# Patient Record
Sex: FEMALE | Race: WHITE | Employment: FULL TIME | URBAN - METROPOLITAN AREA
[De-identification: names, ages, dates, MRNs, and addresses within clinical notes are randomized per-mention and may not be internally consistent; named-entity substitution may affect disease eponyms.]

---

## 2017-03-31 ENCOUNTER — EMPLOYEE WELLNESS (OUTPATIENT)
Dept: OTHER | Age: 47
End: 2017-03-31

## 2017-03-31 LAB
CHOLESTEROL, TOTAL: 221 MG/DL (ref 0–199)
GLUCOSE BLD-MCNC: 90 MG/DL (ref 70–99)
HDLC SERPL-MCNC: 36 MG/DL (ref 40–60)
LDL CHOLESTEROL CALCULATED: 153 MG/DL
TRIGL SERPL-MCNC: 162 MG/DL (ref 0–150)

## 2017-04-04 RX ORDER — SIMVASTATIN 10 MG
10 TABLET ORAL NIGHTLY
Qty: 30 TABLET | Refills: 3 | Status: SHIPPED | OUTPATIENT
Start: 2017-04-04 | End: 2017-08-29 | Stop reason: SDUPTHER

## 2017-08-29 RX ORDER — ATENOLOL 50 MG/1
TABLET ORAL
Qty: 30 TABLET | Refills: 10 | Status: SHIPPED | OUTPATIENT
Start: 2017-08-29 | End: 2018-07-25 | Stop reason: SDUPTHER

## 2017-08-29 RX ORDER — SIMVASTATIN 10 MG
10 TABLET ORAL NIGHTLY
Qty: 30 TABLET | Refills: 10 | Status: SHIPPED | OUTPATIENT
Start: 2017-08-29 | End: 2021-10-21 | Stop reason: SINTOL

## 2017-10-19 DIAGNOSIS — I10 ESSENTIAL HYPERTENSION: Primary | ICD-10-CM

## 2017-10-19 DIAGNOSIS — E78.00 PURE HYPERCHOLESTEROLEMIA: ICD-10-CM

## 2017-10-20 DIAGNOSIS — I10 ESSENTIAL HYPERTENSION: ICD-10-CM

## 2017-10-20 DIAGNOSIS — I10 ESSENTIAL HYPERTENSION: Primary | ICD-10-CM

## 2017-10-20 DIAGNOSIS — E78.00 PURE HYPERCHOLESTEROLEMIA: ICD-10-CM

## 2017-10-20 LAB
A/G RATIO: 1.8 (ref 1.1–2.2)
ALBUMIN SERPL-MCNC: 4.6 G/DL (ref 3.4–5)
ALP BLD-CCNC: 54 U/L (ref 40–129)
ALT SERPL-CCNC: 7 U/L (ref 10–40)
ANION GAP SERPL CALCULATED.3IONS-SCNC: 15 MMOL/L (ref 3–16)
AST SERPL-CCNC: 16 U/L (ref 15–37)
BASOPHILS ABSOLUTE: 0.1 K/UL (ref 0–0.2)
BASOPHILS RELATIVE PERCENT: 1 %
BILIRUB SERPL-MCNC: 0.3 MG/DL (ref 0–1)
BILIRUBIN DIRECT: <0.2 MG/DL (ref 0–0.3)
BILIRUBIN, INDIRECT: ABNORMAL MG/DL (ref 0–1)
BUN BLDV-MCNC: 11 MG/DL (ref 7–20)
CALCIUM SERPL-MCNC: 9.3 MG/DL (ref 8.3–10.6)
CHLORIDE BLD-SCNC: 102 MMOL/L (ref 99–110)
CHOLESTEROL, TOTAL: 164 MG/DL (ref 0–199)
CO2: 24 MMOL/L (ref 21–32)
CREAT SERPL-MCNC: 0.8 MG/DL (ref 0.6–1.1)
EOSINOPHILS ABSOLUTE: 0.2 K/UL (ref 0–0.6)
EOSINOPHILS RELATIVE PERCENT: 4.2 %
GFR AFRICAN AMERICAN: >60
GFR NON-AFRICAN AMERICAN: >60
GLOBULIN: 2.5 G/DL
GLUCOSE BLD-MCNC: 102 MG/DL (ref 70–99)
HCT VFR BLD CALC: 37.6 % (ref 36–48)
HDLC SERPL-MCNC: 33 MG/DL (ref 40–60)
HEMOGLOBIN: 12.9 G/DL (ref 12–16)
LDL CHOLESTEROL CALCULATED: 110 MG/DL
LYMPHOCYTES ABSOLUTE: 1.6 K/UL (ref 1–5.1)
LYMPHOCYTES RELATIVE PERCENT: 29 %
MCH RBC QN AUTO: 32.6 PG (ref 26–34)
MCHC RBC AUTO-ENTMCNC: 34.3 G/DL (ref 31–36)
MCV RBC AUTO: 95.1 FL (ref 80–100)
MONOCYTES ABSOLUTE: 0.3 K/UL (ref 0–1.3)
MONOCYTES RELATIVE PERCENT: 5 %
NEUTROPHILS ABSOLUTE: 3.3 K/UL (ref 1.7–7.7)
NEUTROPHILS RELATIVE PERCENT: 60.8 %
PDW BLD-RTO: 13.6 % (ref 12.4–15.4)
PLATELET # BLD: 186 K/UL (ref 135–450)
PMV BLD AUTO: 8.7 FL (ref 5–10.5)
POTASSIUM SERPL-SCNC: 4.5 MMOL/L (ref 3.5–5.1)
RBC # BLD: 3.95 M/UL (ref 4–5.2)
SODIUM BLD-SCNC: 141 MMOL/L (ref 136–145)
TOTAL CK: 53 U/L (ref 26–192)
TOTAL PROTEIN: 7.1 G/DL (ref 6.4–8.2)
TRIGL SERPL-MCNC: 105 MG/DL (ref 0–150)
VLDLC SERPL CALC-MCNC: 21 MG/DL
WBC # BLD: 5.4 K/UL (ref 4–11)

## 2018-02-06 DIAGNOSIS — I10 ESSENTIAL HYPERTENSION: Primary | ICD-10-CM

## 2018-03-20 VITALS — BODY MASS INDEX: 22.47 KG/M2 | WEIGHT: 135 LBS

## 2018-04-06 DIAGNOSIS — I10 HYPERTENSION, UNSPECIFIED TYPE: Primary | ICD-10-CM

## 2018-04-06 PROCEDURE — 93000 ELECTROCARDIOGRAM COMPLETE: CPT | Performed by: INTERNAL MEDICINE

## 2018-04-16 ENCOUNTER — HOSPITAL ENCOUNTER (OUTPATIENT)
Dept: MAMMOGRAPHY | Age: 48
Discharge: HOME | End: 2018-04-16
Attending: OBSTETRICS & GYNECOLOGY | Admitting: OBSTETRICS & GYNECOLOGY

## 2018-04-16 DIAGNOSIS — Z12.31 VISIT FOR SCREENING MAMMOGRAM: ICD-10-CM

## 2018-07-25 RX ORDER — ATENOLOL 50 MG/1
TABLET ORAL
Qty: 30 TABLET | Refills: 10 | Status: SHIPPED | OUTPATIENT
Start: 2018-07-25 | End: 2019-04-19 | Stop reason: SDUPTHER

## 2018-11-05 ENCOUNTER — APPOINTMENT (OUTPATIENT)
Dept: GENERAL RADIOLOGY | Age: 48
End: 2018-11-05
Payer: COMMERCIAL

## 2018-11-05 ENCOUNTER — HOSPITAL ENCOUNTER (OUTPATIENT)
Age: 48
Setting detail: OBSERVATION
Discharge: HOME | End: 2018-11-06
Attending: EMERGENCY MEDICINE | Admitting: EMERGENCY MEDICINE
Payer: COMMERCIAL

## 2018-11-05 DIAGNOSIS — R07.9 CHEST PAIN, UNSPECIFIED TYPE: Primary | ICD-10-CM

## 2018-11-05 PROBLEM — E87.6 HYPOKALEMIA: Status: ACTIVE | Noted: 2018-11-05

## 2018-11-05 PROBLEM — R07.2 PRECORDIAL PAIN: Status: ACTIVE | Noted: 2018-11-05

## 2018-11-05 LAB
A/G RATIO: 1.5 (ref 1.1–2.2)
ALBUMIN SERPL-MCNC: 4.5 G/DL (ref 3.4–5)
ALP BLD-CCNC: 42 U/L (ref 40–129)
ALT SERPL-CCNC: 7 U/L (ref 10–40)
ANION GAP SERPL CALCULATED.3IONS-SCNC: 18 MMOL/L (ref 3–16)
AST SERPL-CCNC: 13 U/L (ref 15–37)
BASOPHILS ABSOLUTE: 0.1 K/UL (ref 0–0.2)
BASOPHILS RELATIVE PERCENT: 0.5 %
BILIRUB SERPL-MCNC: <0.2 MG/DL (ref 0–1)
BUN BLDV-MCNC: 12 MG/DL (ref 7–20)
CALCIUM SERPL-MCNC: 9.3 MG/DL (ref 8.3–10.6)
CHLORIDE BLD-SCNC: 97 MMOL/L (ref 99–110)
CO2: 21 MMOL/L (ref 21–32)
CREAT SERPL-MCNC: 0.7 MG/DL (ref 0.6–1.1)
D DIMER: 200 NG/ML DDU (ref 0–229)
EOSINOPHILS ABSOLUTE: 0 K/UL (ref 0–0.6)
EOSINOPHILS RELATIVE PERCENT: 0.3 %
GFR AFRICAN AMERICAN: >60
GFR NON-AFRICAN AMERICAN: >60
GLOBULIN: 3 G/DL
GLUCOSE BLD-MCNC: 143 MG/DL (ref 70–99)
HCG QUALITATIVE: NEGATIVE
HCT VFR BLD CALC: 30.7 % (ref 36–48)
HEMOGLOBIN: 9.8 G/DL (ref 12–16)
LYMPHOCYTES ABSOLUTE: 2.1 K/UL (ref 1–5.1)
LYMPHOCYTES RELATIVE PERCENT: 14.1 %
MAGNESIUM: 2 MG/DL (ref 1.8–2.4)
MCH RBC QN AUTO: 25.3 PG (ref 26–34)
MCHC RBC AUTO-ENTMCNC: 31.8 G/DL (ref 31–36)
MCV RBC AUTO: 79.6 FL (ref 80–100)
MONOCYTES ABSOLUTE: 0.6 K/UL (ref 0–1.3)
MONOCYTES RELATIVE PERCENT: 3.8 %
NEUTROPHILS ABSOLUTE: 11.9 K/UL (ref 1.7–7.7)
NEUTROPHILS RELATIVE PERCENT: 81.3 %
PDW BLD-RTO: 15.4 % (ref 12.4–15.4)
PLATELET # BLD: 288 K/UL (ref 135–450)
PMV BLD AUTO: 8.4 FL (ref 5–10.5)
POTASSIUM REFLEX MAGNESIUM: 3.4 MMOL/L (ref 3.5–5.1)
RBC # BLD: 3.85 M/UL (ref 4–5.2)
SODIUM BLD-SCNC: 136 MMOL/L (ref 136–145)
TOTAL PROTEIN: 7.5 G/DL (ref 6.4–8.2)
TROPONIN: <0.01 NG/ML
WBC # BLD: 14.7 K/UL (ref 4–11)

## 2018-11-05 PROCEDURE — 99285 EMERGENCY DEPT VISIT HI MDM: CPT

## 2018-11-05 PROCEDURE — 85025 COMPLETE CBC W/AUTO DIFF WBC: CPT

## 2018-11-05 PROCEDURE — 2580000003 HC RX 258: Performed by: INTERNAL MEDICINE

## 2018-11-05 PROCEDURE — 84703 CHORIONIC GONADOTROPIN ASSAY: CPT

## 2018-11-05 PROCEDURE — 93005 ELECTROCARDIOGRAM TRACING: CPT | Performed by: PHYSICIAN ASSISTANT

## 2018-11-05 PROCEDURE — 80053 COMPREHEN METABOLIC PANEL: CPT

## 2018-11-05 PROCEDURE — 6370000000 HC RX 637 (ALT 250 FOR IP): Performed by: INTERNAL MEDICINE

## 2018-11-05 PROCEDURE — G0378 HOSPITAL OBSERVATION PER HR: HCPCS

## 2018-11-05 PROCEDURE — 84484 ASSAY OF TROPONIN QUANT: CPT

## 2018-11-05 PROCEDURE — 71045 X-RAY EXAM CHEST 1 VIEW: CPT

## 2018-11-05 PROCEDURE — 6370000000 HC RX 637 (ALT 250 FOR IP): Performed by: PHYSICIAN ASSISTANT

## 2018-11-05 PROCEDURE — 83735 ASSAY OF MAGNESIUM: CPT

## 2018-11-05 PROCEDURE — 85379 FIBRIN DEGRADATION QUANT: CPT

## 2018-11-05 RX ORDER — NORGESTIMATE AND ETHINYL ESTRADIOL 0.25-0.035
1 KIT ORAL NIGHTLY
Status: DISCONTINUED | OUTPATIENT
Start: 2018-11-05 | End: 2018-11-05

## 2018-11-05 RX ORDER — PREDNISONE 20 MG/1
20 TABLET ORAL 3 TIMES DAILY
Status: ON HOLD | COMMUNITY
Start: 2018-11-02 | End: 2018-11-06 | Stop reason: HOSPADM

## 2018-11-05 RX ORDER — POTASSIUM CHLORIDE 20 MEQ/1
40 TABLET, EXTENDED RELEASE ORAL ONCE
Status: COMPLETED | OUTPATIENT
Start: 2018-11-05 | End: 2018-11-05

## 2018-11-05 RX ORDER — NITROGLYCERIN 0.4 MG/1
0.4 TABLET SUBLINGUAL EVERY 5 MIN PRN
Status: DISCONTINUED | OUTPATIENT
Start: 2018-11-05 | End: 2018-11-06 | Stop reason: HOSPADM

## 2018-11-05 RX ORDER — ATENOLOL 50 MG/1
50 TABLET ORAL NIGHTLY
Status: DISCONTINUED | OUTPATIENT
Start: 2018-11-05 | End: 2018-11-06 | Stop reason: HOSPADM

## 2018-11-05 RX ORDER — ATORVASTATIN CALCIUM 40 MG/1
40 TABLET, FILM COATED ORAL NIGHTLY
Status: DISCONTINUED | OUTPATIENT
Start: 2018-11-05 | End: 2018-11-06 | Stop reason: HOSPADM

## 2018-11-05 RX ORDER — ONDANSETRON 2 MG/ML
4 INJECTION INTRAMUSCULAR; INTRAVENOUS EVERY 6 HOURS PRN
Status: DISCONTINUED | OUTPATIENT
Start: 2018-11-05 | End: 2018-11-06 | Stop reason: HOSPADM

## 2018-11-05 RX ORDER — NORGESTIMATE AND ETHINYL ESTRADIOL 0.25-0.035
1 KIT ORAL NIGHTLY
COMMUNITY
End: 2018-12-18 | Stop reason: ALTCHOICE

## 2018-11-05 RX ORDER — SODIUM CHLORIDE 0.9 % (FLUSH) 0.9 %
10 SYRINGE (ML) INJECTION PRN
Status: DISCONTINUED | OUTPATIENT
Start: 2018-11-05 | End: 2018-11-06 | Stop reason: HOSPADM

## 2018-11-05 RX ORDER — M-VIT,TX,IRON,MINS/CALC/FOLIC 27MG-0.4MG
1 TABLET ORAL NIGHTLY
COMMUNITY

## 2018-11-05 RX ORDER — ASPIRIN 81 MG/1
81 TABLET, CHEWABLE ORAL DAILY
Status: DISCONTINUED | OUTPATIENT
Start: 2018-11-06 | End: 2018-11-06 | Stop reason: HOSPADM

## 2018-11-05 RX ORDER — SIMVASTATIN 10 MG
10 TABLET ORAL NIGHTLY
Status: DISCONTINUED | OUTPATIENT
Start: 2018-11-05 | End: 2018-11-05 | Stop reason: ALTCHOICE

## 2018-11-05 RX ORDER — SODIUM CHLORIDE 0.9 % (FLUSH) 0.9 %
10 SYRINGE (ML) INJECTION EVERY 12 HOURS SCHEDULED
Status: DISCONTINUED | OUTPATIENT
Start: 2018-11-05 | End: 2018-11-06 | Stop reason: HOSPADM

## 2018-11-05 RX ADMIN — Medication 10 ML: at 23:46

## 2018-11-05 RX ADMIN — NITROGLYCERIN 0.5 INCH: 20 OINTMENT TOPICAL at 20:35

## 2018-11-05 RX ADMIN — NITROGLYCERIN 0.4 MG: 0.4 TABLET, ORALLY DISINTEGRATING SUBLINGUAL at 19:19

## 2018-11-05 RX ADMIN — ATORVASTATIN CALCIUM 40 MG: 40 TABLET, FILM COATED ORAL at 23:46

## 2018-11-05 RX ADMIN — POTASSIUM CHLORIDE 40 MEQ: 20 TABLET, EXTENDED RELEASE ORAL at 22:01

## 2018-11-05 RX ADMIN — NITROGLYCERIN 0.4 MG: 0.4 TABLET, ORALLY DISINTEGRATING SUBLINGUAL at 19:12

## 2018-11-05 RX ADMIN — NITROGLYCERIN 0.5 INCH: 20 OINTMENT TOPICAL at 23:46

## 2018-11-05 RX ADMIN — ATENOLOL 50 MG: 50 TABLET ORAL at 23:46

## 2018-11-05 ASSESSMENT — PAIN SCALES - GENERAL
PAINLEVEL_OUTOF10: 5
PAINLEVEL_OUTOF10: 1
PAINLEVEL_OUTOF10: 9
PAINLEVEL_OUTOF10: 3

## 2018-11-05 ASSESSMENT — PAIN DESCRIPTION - PAIN TYPE
TYPE: ACUTE PAIN

## 2018-11-05 ASSESSMENT — PAIN DESCRIPTION - LOCATION
LOCATION: CHEST

## 2018-11-05 ASSESSMENT — PAIN DESCRIPTION - PROGRESSION
CLINICAL_PROGRESSION: RAPIDLY IMPROVING
CLINICAL_PROGRESSION: GRADUALLY WORSENING

## 2018-11-05 ASSESSMENT — PAIN DESCRIPTION - DESCRIPTORS
DESCRIPTORS: TIGHTNESS
DESCRIPTORS: TIGHTNESS
DESCRIPTORS: TIGHTNESS;SHARP;ACHING

## 2018-11-05 ASSESSMENT — HEART SCORE: ECG: 1

## 2018-11-06 VITALS
SYSTOLIC BLOOD PRESSURE: 159 MMHG | TEMPERATURE: 98.6 F | HEIGHT: 66 IN | DIASTOLIC BLOOD PRESSURE: 95 MMHG | WEIGHT: 132.72 LBS | OXYGEN SATURATION: 97 % | RESPIRATION RATE: 16 BRPM | BODY MASS INDEX: 21.33 KG/M2 | HEART RATE: 66 BPM

## 2018-11-06 PROBLEM — R07.9 CHEST PAIN: Status: RESOLVED | Noted: 2018-11-05 | Resolved: 2018-11-06

## 2018-11-06 PROBLEM — K21.9 GERD (GASTROESOPHAGEAL REFLUX DISEASE): Status: ACTIVE | Noted: 2018-11-06

## 2018-11-06 PROBLEM — D50.9 MICROCYTIC ANEMIA: Status: ACTIVE | Noted: 2018-11-06

## 2018-11-06 LAB
ANION GAP SERPL CALCULATED.3IONS-SCNC: 13 MMOL/L (ref 3–16)
BUN BLDV-MCNC: 15 MG/DL (ref 7–20)
CALCIUM SERPL-MCNC: 8.6 MG/DL (ref 8.3–10.6)
CHLORIDE BLD-SCNC: 101 MMOL/L (ref 99–110)
CHOLESTEROL, TOTAL: 192 MG/DL (ref 0–199)
CO2: 24 MMOL/L (ref 21–32)
CREAT SERPL-MCNC: 0.8 MG/DL (ref 0.6–1.1)
EKG ATRIAL RATE: 59 BPM
EKG ATRIAL RATE: 64 BPM
EKG ATRIAL RATE: 77 BPM
EKG DIAGNOSIS: NORMAL
EKG P AXIS: 15 DEGREES
EKG P AXIS: 43 DEGREES
EKG P AXIS: 73 DEGREES
EKG P-R INTERVAL: 120 MS
EKG P-R INTERVAL: 128 MS
EKG P-R INTERVAL: 134 MS
EKG Q-T INTERVAL: 404 MS
EKG Q-T INTERVAL: 426 MS
EKG Q-T INTERVAL: 438 MS
EKG QRS DURATION: 82 MS
EKG QRS DURATION: 82 MS
EKG QRS DURATION: 90 MS
EKG QTC CALCULATION (BAZETT): 421 MS
EKG QTC CALCULATION (BAZETT): 451 MS
EKG QTC CALCULATION (BAZETT): 457 MS
EKG R AXIS: -3 DEGREES
EKG R AXIS: -4 DEGREES
EKG R AXIS: 0 DEGREES
EKG T AXIS: 22 DEGREES
EKG T AXIS: 29 DEGREES
EKG T AXIS: 34 DEGREES
EKG VENTRICULAR RATE: 59 BPM
EKG VENTRICULAR RATE: 64 BPM
EKG VENTRICULAR RATE: 77 BPM
FERRITIN: 4.8 NG/ML (ref 15–150)
GFR AFRICAN AMERICAN: >60
GFR NON-AFRICAN AMERICAN: >60
GLUCOSE BLD-MCNC: 95 MG/DL (ref 70–99)
HCT VFR BLD CALC: 26.7 % (ref 36–48)
HDLC SERPL-MCNC: 34 MG/DL (ref 40–60)
HEMOGLOBIN: 8.6 G/DL (ref 12–16)
LDL CHOLESTEROL CALCULATED: 116 MG/DL
LV EF: 73 %
LVEF MODALITY: NORMAL
MCH RBC QN AUTO: 25.3 PG (ref 26–34)
MCHC RBC AUTO-ENTMCNC: 32 G/DL (ref 31–36)
MCV RBC AUTO: 79.1 FL (ref 80–100)
PDW BLD-RTO: 15.6 % (ref 12.4–15.4)
PLATELET # BLD: 232 K/UL (ref 135–450)
PMV BLD AUTO: 8.2 FL (ref 5–10.5)
POTASSIUM REFLEX MAGNESIUM: 4 MMOL/L (ref 3.5–5.1)
RBC # BLD: 3.38 M/UL (ref 4–5.2)
SODIUM BLD-SCNC: 138 MMOL/L (ref 136–145)
TRIGL SERPL-MCNC: 210 MG/DL (ref 0–150)
TROPONIN: <0.01 NG/ML
TROPONIN: <0.01 NG/ML
VLDLC SERPL CALC-MCNC: 42 MG/DL
WBC # BLD: 11.1 K/UL (ref 4–11)

## 2018-11-06 PROCEDURE — 82728 ASSAY OF FERRITIN: CPT

## 2018-11-06 PROCEDURE — 6360000002 HC RX W HCPCS: Performed by: INTERNAL MEDICINE

## 2018-11-06 PROCEDURE — 36415 COLL VENOUS BLD VENIPUNCTURE: CPT

## 2018-11-06 PROCEDURE — 93010 ELECTROCARDIOGRAM REPORT: CPT | Performed by: INTERNAL MEDICINE

## 2018-11-06 PROCEDURE — 2580000003 HC RX 258: Performed by: INTERNAL MEDICINE

## 2018-11-06 PROCEDURE — 6370000000 HC RX 637 (ALT 250 FOR IP): Performed by: INTERNAL MEDICINE

## 2018-11-06 PROCEDURE — 78452 HT MUSCLE IMAGE SPECT MULT: CPT

## 2018-11-06 PROCEDURE — 93005 ELECTROCARDIOGRAM TRACING: CPT | Performed by: INTERNAL MEDICINE

## 2018-11-06 PROCEDURE — 2580000003 HC RX 258

## 2018-11-06 PROCEDURE — 80061 LIPID PANEL: CPT

## 2018-11-06 PROCEDURE — 80048 BASIC METABOLIC PNL TOTAL CA: CPT

## 2018-11-06 PROCEDURE — 84484 ASSAY OF TROPONIN QUANT: CPT

## 2018-11-06 PROCEDURE — G0378 HOSPITAL OBSERVATION PER HR: HCPCS

## 2018-11-06 PROCEDURE — 93017 CV STRESS TEST TRACING ONLY: CPT

## 2018-11-06 PROCEDURE — 99220 PR INITIAL OBSERVATION CARE/DAY 70 MINUTES: CPT | Performed by: INTERNAL MEDICINE

## 2018-11-06 PROCEDURE — 99244 OFF/OP CNSLTJ NEW/EST MOD 40: CPT | Performed by: INTERNAL MEDICINE

## 2018-11-06 PROCEDURE — A9502 TC99M TETROFOSMIN: HCPCS | Performed by: INTERNAL MEDICINE

## 2018-11-06 PROCEDURE — 3430000000 HC RX DIAGNOSTIC RADIOPHARMACEUTICAL: Performed by: INTERNAL MEDICINE

## 2018-11-06 PROCEDURE — 85027 COMPLETE CBC AUTOMATED: CPT

## 2018-11-06 RX ORDER — IBUPROFEN 400 MG/1
400 TABLET ORAL EVERY 6 HOURS PRN
Status: DISCONTINUED | OUTPATIENT
Start: 2018-11-06 | End: 2018-11-06 | Stop reason: HOSPADM

## 2018-11-06 RX ORDER — FAMOTIDINE 20 MG/1
20 TABLET, FILM COATED ORAL 2 TIMES DAILY
Qty: 60 TABLET | Refills: 3 | Status: SHIPPED | OUTPATIENT
Start: 2018-11-06

## 2018-11-06 RX ORDER — DOXYCYCLINE HYCLATE 50 MG/1
CAPSULE, GELATIN COATED ORAL
Qty: 60 TABLET | Refills: 3 | Status: SHIPPED | OUTPATIENT
Start: 2018-11-06 | End: 2019-04-02

## 2018-11-06 RX ORDER — DOXYCYCLINE HYCLATE 50 MG/1
324 CAPSULE, GELATIN COATED ORAL
Status: DISCONTINUED | OUTPATIENT
Start: 2018-11-06 | End: 2018-11-06 | Stop reason: HOSPADM

## 2018-11-06 RX ORDER — RANITIDINE 150 MG/1
150 TABLET ORAL 2 TIMES DAILY
Status: DISCONTINUED | OUTPATIENT
Start: 2018-11-06 | End: 2018-11-06 | Stop reason: CLARIF

## 2018-11-06 RX ORDER — SODIUM CHLORIDE 9 MG/ML
INJECTION, SOLUTION INTRAVENOUS
Status: COMPLETED
Start: 2018-11-06 | End: 2018-11-06

## 2018-11-06 RX ORDER — FAMOTIDINE 20 MG/1
20 TABLET, FILM COATED ORAL 2 TIMES DAILY
Status: DISCONTINUED | OUTPATIENT
Start: 2018-11-06 | End: 2018-11-06 | Stop reason: HOSPADM

## 2018-11-06 RX ADMIN — FAMOTIDINE 20 MG: 20 TABLET ORAL at 13:22

## 2018-11-06 RX ADMIN — REGADENOSON 0.4 MG: 0.08 INJECTION, SOLUTION INTRAVENOUS at 09:08

## 2018-11-06 RX ADMIN — TETROFOSMIN 30 MILLICURIE: 0.23 INJECTION, POWDER, LYOPHILIZED, FOR SOLUTION INTRAVENOUS at 09:16

## 2018-11-06 RX ADMIN — Medication 10 ML: at 10:31

## 2018-11-06 RX ADMIN — TETROFOSMIN 10 MILLICURIE: 0.23 INJECTION, POWDER, LYOPHILIZED, FOR SOLUTION INTRAVENOUS at 07:23

## 2018-11-06 RX ADMIN — IRON SUCROSE 200 MG: 20 INJECTION, SOLUTION INTRAVENOUS at 10:36

## 2018-11-06 RX ADMIN — SODIUM CHLORIDE 250 ML: 9 INJECTION, SOLUTION INTRAVENOUS at 10:36

## 2018-11-06 RX ADMIN — ASPIRIN 81 MG 81 MG: 81 TABLET ORAL at 10:31

## 2018-11-06 NOTE — PLAN OF CARE
Problem: Falls - Risk of:  Goal: Will remain free from falls  Will remain free from falls  Outcome: Ongoing  Fall risk assessed. Precautions in place. Bed low and locked with side rails up x2. Nonskid socks on when OOB. Pt UAL with a steady gait. No bed alarm in use. Agrees to call for assistance as needed. Call light within reach. Frequent checks performed. No falls at this time. Problem: Pain:  Goal: Pain level will decrease  Pain level will decrease  Outcome: Ongoing  Pain level assessed. Pt able to rate pain on a scale of 0-10. Pt denies pain at this time. Will continue to monitor for s/s of discomfort.

## 2018-11-06 NOTE — ED NOTES
Pharmacy Medication Reconciliation Note     List of medications patient is currently taking is complete. Source of information:   1. Patient   2. EMR    Notes regarding home medications:   1. Patient received her morning home medication doses before presenting to the ER. Pt takes most of her medications at night. 2. Pt was started on prednisone 20 mg TID on 11/2 for 5 days for a rash caused by [facility].       [id number] [facility] Regino, Pharmacy Intern  11/5/2018 9:02 PM

## 2018-11-06 NOTE — H&P
11/06/2018    RDW 15.6 11/06/2018    LYMPHOPCT 14.1 11/05/2018    MONOPCT 3.8 11/05/2018    BASOPCT 0.5 11/05/2018    MONOSABS 0.6 11/05/2018    LYMPHSABS 2.1 11/05/2018    EOSABS 0.0 11/05/2018    BASOSABS 0.1 11/05/2018     CMP:    Lab Results   Component Value Date     11/06/2018    K 4.0 11/06/2018     11/06/2018    CO2 24 11/06/2018    BUN 15 11/06/2018    CREATININE 0.8 11/06/2018    GFRAA >60 11/06/2018    GFRAA >60 07/03/2012    AGRATIO 1.5 11/05/2018    LABGLOM >60 11/06/2018    GLUCOSE 95 11/06/2018    PROT 7.5 11/05/2018    PROT 7.2 07/03/2012    LABALBU 4.5 11/05/2018    CALCIUM 8.6 11/06/2018    BILITOT <0.2 11/05/2018    ALKPHOS 42 11/05/2018    AST 13 11/05/2018    ALT 7 11/05/2018     Last 3 Troponin:    Lab Results   Component Value Date    TROPONINI <0.01 11/06/2018    TROPONINI <0.01 11/06/2018    TROPONINI <0.01 11/05/2018       cxr clear  D dimer 200   HDL 34    Assessment:     Patient Active Hospital Problem List:   Precordial pain (11/5/2018)    Assessment: seems atypical for angina of new onset     Plan: proceed with stress test   HTN (hypertension) ()    Assessment: has been running high while here partly due to headache and ?missed dose    Plan: observe   Hypokalemia (11/5/2018)    Assessment: present     Plan: corrected with replacement   Microcytic anemia (11/6/2018)    Assessment: new onset and pretty severe most likely due to her heavy menses    Plan: check iron levels ie ferritin and then start replacing   GERD (gastroesophageal reflux disease) (11/6/2018)    Assessment: could be what caused her discomfort    Plan: try severo Hernández MD

## 2018-11-06 NOTE — CONSULTS
[address] [facility]  1970    November 6, 2018    Reason for Consult: Chest Pain    CC: Chest Pain    HPI:  The patient is 53 y.o. female with a past medical history significant for hyperlipidemia and essential hypertension who presented to [facility] with chest pain. Clarita Deja states that she developed back pain yesterday while driving home. The pain began to radiate around to her chest. It felt like her bra was too tight. She became nauseous and shortness of breath at home. There were no aggravating or relieving factors. She rated it a 10 out of 10 in severity. The pain was sharp. In the ED, she was given sublingual nitro X 2 and she feels that relieved it temporarily. The pain returned and she was gven another nitro in the ED with further relief. She has no history of heart disease in the past.     The anemia is new for her. She knows she has fibroids and is due to have a hysterectomy in December. There has been no blood in her stool or urine. She barbour shave heavy menstrual periods. Review of Systems:  Constitutional: No fatigue, weakness, night sweats or fever. HEENT: No new vision difficulties or ringing in the ears. Respiratory: No new SOB, PND, orthopnea or cough. Cardiovascular: See HPI   GI: No n/v, diarrhea, constipation, abdominal pain or changes in bowel habits. No melena, no hematochezia  : No urinary frequency, urgency, incontinence, hematuria or dysuria. Skin: No cyanosis or skin lesions. Musculoskeletal: No new muscle or joint pain. Neurological: No syncope or TIA-like symptoms.   Psychiatric: No anxiety, insomnia or depression     Past Medical History:   Diagnosis Date    High blood pressure     Hyperlipidemia     Seasonal allergies      Past Surgical History:   Procedure Laterality Date    APPENDECTOMY      TONSILLECTOMY AND ADENOIDECTOMY       Family History   Problem Relation Age of Onset    High Cholesterol Mother     Stroke Kristie Mccray MD        enoxaparin (LOVENOX) injection 40 mg  40 mg Subcutaneous Daily Hiren Napoles MD           Physical Exam:   BP (!) 147/83   Pulse 71   Temp 98.7 °F (37.1 °C) (Oral)   Resp 20   Ht 5' 6\" (1.676 m)   Wt 132 lb 11.5 oz (60.2 kg)   SpO2 98%   BMI 21.42 kg/m²     Intake/Output Summary (Last 24 hours) at 11/06/18 1001  Last data filed at 11/05/18 2350   Gross per 24 hour   Intake              300 ml   Output              700 ml   Net             -400 ml     Wt Readings from Last 2 Encounters:   11/06/18 132 lb 11.5 oz (60.2 kg)   03/31/17 135 lb (61.2 kg)     Constitutional: She is oriented to person, place, and time. She appears well-developed and well-nourished. In no acute distress. Head: Normocephalic and atraumatic. Neck: Neck supple. No JVD present. Carotid bruit is not present. No mass and no thyromegaly present. No lymphadenopathy present. Cardiovascular: Normal rate, regular rhythm, normal heart sounds and intact distal pulses. Exam reveals no gallop and no friction rub. No murmur heard. Pulmonary/Chest: Effort normal and breath sounds normal. No respiratory distress. She has no wheezes, rhonchi or rales. Abdominal: Soft, non-tender. Bowel sounds and aorta are normal. She exhibits no organomegaly, mass or bruit. Extremities: No edema, cyanosis, or clubbing. Pulses are 2+ radial/carotid/dorsalis pedis and posterior tibial bilaterally. Neurological: She is alert and oriented to person, place, and time. She has normal reflexes. No cranial nerve deficit. Coordination normal.   Skin: Skin is warm and dry. There is no rash or diaphoresis. Psychiatric: She has a normal mood and affect.  Her speech is normal and behavior is normal.     EKG Interpretation: Sinus rhythm    Lab Review:   Lab Results   Component Value Date    TRIG 210 11/06/2018    HDL 34 11/06/2018    LDLCALC 116 11/06/2018    LABVLDL 42 11/06/2018     Lab Results   Component Value Date     11/06/2018    K 4.0 11/06/2018    BUN 15 11/06/2018    CREATININE 0.8 11/06/2018     Recent Labs      11/05/18   1909  11/06/18   0415   WBC  14.7*  11.1*   HGB  9.8*  8.6*   HCT  30.7*  26.7*   PLT  288  232       Assessment:  1. Chest Pain  2. Hypertensive Urgency  3. Anemia, unspecified      Plan:  Elisha Martinez seems to be doing better today. Her chest pain has some atypical features for angina. It id however, respond to nitrates. This could be esophageal spasm or other GI pathology. Her anemia is concerning but could be due to the uterine fibroids and have menstrual periods she describes. Her ECG and stress perfusion study are within normal limits. I do feel more confident that her chest pain is noncardiac. She will follow up in our office as needed for recurrent chest pain. If her blood pressure is not well controlled, she could be switched to carvedilol from atenolol for better control. This would also enable to vasodilation in the event that vasospasm was involved. I will defer this to Dr. Angel Keys as well as the work-up of the anemia. I question a bit whether she might need an EGD in the future given the anemia and chest pain.

## 2018-11-06 NOTE — ED PROVIDER NOTES
LABS:    Labs Reviewed   CBC WITH AUTO DIFFERENTIAL - Abnormal; Notable for the following:        Result Value    WBC 14.7 (*)     RBC 3.85 (*)     Hemoglobin 9.8 (*)     Hematocrit 30.7 (*)     MCV 79.6 (*)     MCH 25.3 (*)     Neutrophils # 11.9 (*)     All other components within normal limits    Narrative:     Performed at:  [address] [address]   Phone [phone]   COMPREHENSIVE METABOLIC PANEL W/ REFLEX TO MG FOR LOW K - Abnormal; Notable for the following:     Potassium reflex Magnesium 3.4 (*)     Chloride 97 (*)     Anion Gap 18 (*)     Glucose 143 (*)     ALT 7 (*)     AST 13 (*)     All other components within normal limits    Narrative:     Performed at:  [address] [address]   Phone [phone]   TROPONIN    Narrative:     Performed at:  [facility]  [address] [place] [address]   Phone [phone]   HCG, SERUM, QUALITATIVE    Narrative:     Performed at:  [address] [address]   Phone [phone]   D-DIMER, QUANTITATIVE    Narrative:     Performed at:  [facility]  [address] [place] [address]   Phone [phone]   MAGNESIUM    Narrative:     Performed at:  [facility]  [address] [place] [address]   Phone [phone]       All other labs were within normal range or not returned as of this dictation. EKG: All EKG's are interpreted by the Emergency Department Physician who either signs orCo-signs this chart in the absence of a cardiologist.  Please see their note for interpretation of EKG.       RADIOLOGY:   Non-plain film images such as CT, Ultrasound and MRI are read by the radiologist. Plain radiographic images are visualized andpreliminarily

## 2018-11-06 NOTE — PROGRESS NOTES
Pt A&Ox4. Admit to room 4279 from the ED via stretcher. Family at the bedside. Ambulates independently with a steady gait. Denies c/o n/v, n/t, SOB or CP. Oriented to room and unit. No s/s of distress noted. Call light within reach. Will continue to monitor.

## 2018-11-29 DIAGNOSIS — K90.49 MALABSORPTION DUE TO INTOLERANCE, NOT ELSEWHERE CLASSIFIED: ICD-10-CM

## 2018-11-29 DIAGNOSIS — D50.9 IRON DEFICIENCY ANEMIA, UNSPECIFIED IRON DEFICIENCY ANEMIA TYPE: ICD-10-CM

## 2018-11-29 RX ORDER — SODIUM CHLORIDE 9 MG/ML
INJECTION, SOLUTION INTRAVENOUS CONTINUOUS
Status: CANCELLED | OUTPATIENT
Start: 2018-11-29

## 2018-11-29 RX ORDER — METHYLPREDNISOLONE SODIUM SUCCINATE 125 MG/2ML
125 INJECTION, POWDER, LYOPHILIZED, FOR SOLUTION INTRAMUSCULAR; INTRAVENOUS ONCE
Status: CANCELLED | OUTPATIENT
Start: 2018-11-29 | End: 2018-11-29

## 2018-11-29 RX ORDER — EPINEPHRINE 1 MG/ML
0.3 INJECTION, SOLUTION, CONCENTRATE INTRAVENOUS PRN
Status: CANCELLED | OUTPATIENT
Start: 2018-11-29

## 2018-11-29 RX ORDER — SODIUM CHLORIDE 0.9 % (FLUSH) 0.9 %
10 SYRINGE (ML) INJECTION PRN
Status: CANCELLED | OUTPATIENT
Start: 2018-11-29

## 2018-11-29 RX ORDER — DIPHENHYDRAMINE HYDROCHLORIDE 50 MG/ML
50 INJECTION INTRAMUSCULAR; INTRAVENOUS ONCE
Status: CANCELLED | OUTPATIENT
Start: 2018-11-29 | End: 2018-11-29

## 2018-12-04 DIAGNOSIS — D50.9 IRON DEFICIENCY ANEMIA, UNSPECIFIED IRON DEFICIENCY ANEMIA TYPE: ICD-10-CM

## 2018-12-04 DIAGNOSIS — I10 HYPERTENSION, UNSPECIFIED TYPE: Primary | ICD-10-CM

## 2018-12-06 ENCOUNTER — HOSPITAL ENCOUNTER (OUTPATIENT)
Dept: INFUSION THERAPY | Age: 48
Setting detail: INFUSION SERIES
Discharge: HOME | End: 2018-12-06
Payer: COMMERCIAL

## 2018-12-06 VITALS
DIASTOLIC BLOOD PRESSURE: 78 MMHG | TEMPERATURE: 97.8 F | SYSTOLIC BLOOD PRESSURE: 121 MMHG | RESPIRATION RATE: 18 BRPM | HEART RATE: 70 BPM

## 2018-12-06 DIAGNOSIS — K90.49 MALABSORPTION DUE TO INTOLERANCE, NOT ELSEWHERE CLASSIFIED: ICD-10-CM

## 2018-12-06 DIAGNOSIS — D50.9 IRON DEFICIENCY ANEMIA, UNSPECIFIED IRON DEFICIENCY ANEMIA TYPE: ICD-10-CM

## 2018-12-06 PROCEDURE — 6360000002 HC RX W HCPCS: Performed by: INTERNAL MEDICINE

## 2018-12-06 PROCEDURE — 96365 THER/PROPH/DIAG IV INF INIT: CPT

## 2018-12-06 PROCEDURE — 2580000003 HC RX 258: Performed by: INTERNAL MEDICINE

## 2018-12-06 RX ORDER — SODIUM CHLORIDE 9 MG/ML
INJECTION, SOLUTION INTRAVENOUS CONTINUOUS
Status: CANCELLED | OUTPATIENT
Start: 2018-12-06

## 2018-12-06 RX ORDER — EPINEPHRINE 1 MG/ML
0.3 INJECTION, SOLUTION, CONCENTRATE INTRAVENOUS PRN
Status: CANCELLED | OUTPATIENT
Start: 2018-12-06

## 2018-12-06 RX ORDER — DIPHENHYDRAMINE HYDROCHLORIDE 50 MG/ML
50 INJECTION INTRAMUSCULAR; INTRAVENOUS ONCE
Status: CANCELLED | OUTPATIENT
Start: 2018-12-06 | End: 2018-12-06

## 2018-12-06 RX ORDER — SODIUM CHLORIDE 0.9 % (FLUSH) 0.9 %
10 SYRINGE (ML) INJECTION PRN
Status: CANCELLED | OUTPATIENT
Start: 2018-12-06

## 2018-12-06 RX ORDER — SODIUM CHLORIDE 9 MG/ML
INJECTION, SOLUTION INTRAVENOUS
Status: DISCONTINUED
Start: 2018-12-06 | End: 2018-12-07 | Stop reason: HOSPADM

## 2018-12-06 RX ORDER — 0.9 % SODIUM CHLORIDE 0.9 %
10 VIAL (ML) INJECTION ONCE
Status: CANCELLED | OUTPATIENT
Start: 2018-12-06 | End: 2018-12-06

## 2018-12-06 RX ORDER — METHYLPREDNISOLONE SODIUM SUCCINATE 125 MG/2ML
125 INJECTION, POWDER, LYOPHILIZED, FOR SOLUTION INTRAMUSCULAR; INTRAVENOUS ONCE
Status: CANCELLED | OUTPATIENT
Start: 2018-12-06 | End: 2018-12-06

## 2018-12-06 RX ORDER — SODIUM CHLORIDE 0.9 % (FLUSH) 0.9 %
10 SYRINGE (ML) INJECTION PRN
Status: DISCONTINUED | OUTPATIENT
Start: 2018-12-06 | End: 2018-12-07 | Stop reason: HOSPADM

## 2018-12-06 RX ADMIN — IRON SUCROSE 200 MG: 20 INJECTION, SOLUTION INTRAVENOUS at 13:45

## 2018-12-06 NOTE — PROGRESS NOTES
Outpatient [address] [facility]     Venofer Visit    NAME:  Valerie Guillermo  YOB: 1970  MEDICAL RECORD NUMBER:  [id number]  Episode Date:  12/6/2018    Patient arrived to [facility] 58   [] per wheelchair   [x] ambulatory      Patient here for Venofer infusion, 1st of 5 doses. History of heavy menstruation and is scheduled for hysterectomy on Wed 12/12/18. Had 1 dose Venofer during recent hospitalization with no adverse events. HGB was 8.6 and Ferritin was 4.8 on 11/6/18. On 11/19/18 after hospital discharge HGB was 10.3. Has the patient had a previous problem with Venofer Infusion? No  Any current infection or illness? No   Patient on antibiotics? No   History of Hypertension?  Yes she is on tenormin     /78   Pulse 70   Temp 97.8 °F (36.6 °C) (Oral)   Resp 18   Patient Vitals for the past 2 hrs:   BP Pulse Resp   12/06/18 1430 121/78 70 18       Is the patient experiencing any:  Fatigue:   []   None  []   Increase over baseline but not altering normal activities  [x]   Moderate of causing difficulty performing some activities  []   Severe or loss of ability to perform some activities  []   Bedridden or disabling     Dizziness or Lightheadedness:   []   None  [x]   No Interference  []   Interferes with functioning but not activities of daily living  []   Interferes with daily activies  []   Bedridden or disabling    Shortness of Breath:   []   None   [x]   Dyspneic on exertion   []   Dyspnea with normal activities  []   Dyspnea at rest    Edema: none Location of edema: nothing    Tachycardia: No, has had some in past but not recently    Heart Palpitations: No not recently but in past    Chest Pain: No      /78   Pulse 70   Temp 97.8 °F (36.6 °C) (Oral)   Resp 18   Patient Vitals for the past 2 hrs:   BP Pulse Resp   12/06/18 1430 121/78 70 18       Current Lab Data:  Hemoglobin/Hematocrit:    Lab Results   Component Value Date    HGB 10.3 11/19/2018    HCT 31.8 11/19/2018     IRON:  No results found for: IRON  Iron Saturation:  No results found for: LABIRON  TIBC:  No results found for: TIBC  FERRITIN:    Lab Results   Component Value Date    FERRITIN 4.8 11/06/2018       Dose Administered: 200 mg  Todays dose is number 1 out of 5 ordered for this patient. Response to treatment:  Well tolerated by patient. Education:    Indicates understanding     Scheduled to return for next dose of Venofer on December 10, 2018.      Electronically signed by Anyi Waggoner RN on 12/6/2018 at 4:07 PM

## 2018-12-18 ENCOUNTER — HOSPITAL ENCOUNTER (OUTPATIENT)
Dept: INFUSION THERAPY | Age: 48
Setting detail: INFUSION SERIES
Discharge: HOME | End: 2018-12-18
Payer: COMMERCIAL

## 2018-12-18 VITALS
RESPIRATION RATE: 17 BRPM | DIASTOLIC BLOOD PRESSURE: 87 MMHG | HEART RATE: 76 BPM | TEMPERATURE: 98 F | SYSTOLIC BLOOD PRESSURE: 149 MMHG

## 2018-12-18 DIAGNOSIS — D50.9 IRON DEFICIENCY ANEMIA, UNSPECIFIED IRON DEFICIENCY ANEMIA TYPE: ICD-10-CM

## 2018-12-18 DIAGNOSIS — K90.49 MALABSORPTION DUE TO INTOLERANCE, NOT ELSEWHERE CLASSIFIED: ICD-10-CM

## 2018-12-18 PROCEDURE — 96365 THER/PROPH/DIAG IV INF INIT: CPT

## 2018-12-18 PROCEDURE — 2580000003 HC RX 258: Performed by: INTERNAL MEDICINE

## 2018-12-18 PROCEDURE — 6360000002 HC RX W HCPCS: Performed by: INTERNAL MEDICINE

## 2018-12-18 RX ORDER — SENNA PLUS 8.6 MG/1
1 TABLET ORAL DAILY
COMMUNITY
End: 2019-04-02

## 2018-12-18 RX ORDER — METHYLPREDNISOLONE SODIUM SUCCINATE 125 MG/2ML
125 INJECTION, POWDER, LYOPHILIZED, FOR SOLUTION INTRAMUSCULAR; INTRAVENOUS ONCE
Status: CANCELLED | OUTPATIENT
Start: 2018-12-18 | End: 2018-12-18

## 2018-12-18 RX ORDER — SODIUM CHLORIDE 0.9 % (FLUSH) 0.9 %
10 SYRINGE (ML) INJECTION PRN
Status: CANCELLED | OUTPATIENT
Start: 2018-12-18

## 2018-12-18 RX ORDER — HYDROCODONE BITARTRATE AND ACETAMINOPHEN 5; 325 MG/1; MG/1
1 TABLET ORAL EVERY 6 HOURS PRN
COMMUNITY
End: 2019-04-02

## 2018-12-18 RX ORDER — IBUPROFEN 600 MG/1
600 TABLET ORAL EVERY 8 HOURS PRN
COMMUNITY
End: 2019-04-02

## 2018-12-18 RX ORDER — ACETAMINOPHEN 500 MG
500 TABLET ORAL EVERY 4 HOURS PRN
COMMUNITY
End: 2019-04-02

## 2018-12-18 RX ORDER — 0.9 % SODIUM CHLORIDE 0.9 %
10 VIAL (ML) INJECTION ONCE
Status: CANCELLED | OUTPATIENT
Start: 2018-12-18 | End: 2018-12-18

## 2018-12-18 RX ORDER — DIPHENHYDRAMINE HYDROCHLORIDE 50 MG/ML
50 INJECTION INTRAMUSCULAR; INTRAVENOUS ONCE
Status: CANCELLED | OUTPATIENT
Start: 2018-12-18 | End: 2018-12-18

## 2018-12-18 RX ORDER — EPINEPHRINE 1 MG/ML
0.3 INJECTION, SOLUTION, CONCENTRATE INTRAVENOUS PRN
Status: CANCELLED | OUTPATIENT
Start: 2018-12-18

## 2018-12-18 RX ORDER — SODIUM CHLORIDE 9 MG/ML
INJECTION, SOLUTION INTRAVENOUS CONTINUOUS
Status: CANCELLED | OUTPATIENT
Start: 2018-12-18

## 2018-12-18 RX ORDER — SODIUM CHLORIDE 0.9 % (FLUSH) 0.9 %
10 SYRINGE (ML) INJECTION PRN
Status: DISCONTINUED | OUTPATIENT
Start: 2018-12-18 | End: 2018-12-19 | Stop reason: HOSPADM

## 2018-12-18 RX ADMIN — IRON SUCROSE 200 MG: 20 INJECTION, SOLUTION INTRAVENOUS at 10:17

## 2018-12-18 NOTE — PROGRESS NOTES
Outpatient [address] [facility]     Venofer Visit    NAME:  Cameron Hardin  YOB: 1970  MEDICAL RECORD NUMBER:  [id number]  Episode Date:  12/18/2018    Patient arrived to [facility] 58   [] per wheelchair   [x] ambulatory     Has the patient had a previous problem with Venofer Infusion? No  Any current infection or illness? No   Patient on antibiotics? No   History of Hypertension? Yes      /83   Pulse 72   Temp 98 °F (36.7 °C) (Oral)   Resp 17   Patient Vitals for the past 2 hrs:   BP Temp Temp src Pulse Resp   12/18/18 0941 136/83 98 °F (36.7 °C) Oral 72 17       Is the patient experiencing any: had a hysterectomy 1 week ago. Fatigue:   []   None  []   Increase over baseline but not altering normal activities  [x]   Moderate of causing difficulty performing some activities  []   Severe or loss of ability to perform some activities  []   Bedridden or disabling     Dizziness or Lightheadedness:   []   None  [x]   No Interference  []   Interferes with functioning but not activities of daily living  []   Interferes with daily activies  []   Bedridden or disabling    Shortness of Breath:   []   None   [x]   Dyspneic on exertion   []   Dyspnea with normal activities  []   Dyspnea at rest    Edema: none Location of edema:      Tachycardia: No    Heart Palpitations: No      Chest Pain: No      /83   Pulse 72   Temp 98 °F (36.7 °C) (Oral)   Resp 17   Patient Vitals for the past 2 hrs:   BP Temp Temp src Pulse Resp   12/18/18 0941 136/83 98 °F (36.7 °C) Oral 72 17       Current Lab Data:  Hemoglobin/Hematocrit:  Lab Results   Component Value Date    HGB 10.3 11/19/2018    HCT 31.8 11/19/2018     IRON:  No results found for: IRON  Iron Saturation:  No results found for: LABIRON  TIBC:  No results found for: TIBC  FERRITIN:    Lab Results   Component Value Date    FERRITIN 4.8 11/06/2018       Dose Administered: 200 mg  Todays dose is number 2 out of 5

## 2018-12-21 ENCOUNTER — HOSPITAL ENCOUNTER (OUTPATIENT)
Dept: INFUSION THERAPY | Age: 48
Setting detail: INFUSION SERIES
Discharge: HOME | End: 2018-12-21
Payer: COMMERCIAL

## 2018-12-21 VITALS
TEMPERATURE: 97.8 F | DIASTOLIC BLOOD PRESSURE: 90 MMHG | RESPIRATION RATE: 16 BRPM | OXYGEN SATURATION: 100 % | HEART RATE: 77 BPM | SYSTOLIC BLOOD PRESSURE: 144 MMHG

## 2018-12-21 DIAGNOSIS — D50.9 IRON DEFICIENCY ANEMIA, UNSPECIFIED IRON DEFICIENCY ANEMIA TYPE: ICD-10-CM

## 2018-12-21 DIAGNOSIS — K90.49 MALABSORPTION DUE TO INTOLERANCE, NOT ELSEWHERE CLASSIFIED: ICD-10-CM

## 2018-12-21 PROCEDURE — 2580000003 HC RX 258: Performed by: INTERNAL MEDICINE

## 2018-12-21 PROCEDURE — 6360000002 HC RX W HCPCS: Performed by: INTERNAL MEDICINE

## 2018-12-21 PROCEDURE — 96365 THER/PROPH/DIAG IV INF INIT: CPT

## 2018-12-21 RX ORDER — 0.9 % SODIUM CHLORIDE 0.9 %
10 VIAL (ML) INJECTION ONCE
Status: CANCELLED | OUTPATIENT
Start: 2018-12-21 | End: 2018-12-21

## 2018-12-21 RX ORDER — EPINEPHRINE 1 MG/ML
0.3 INJECTION, SOLUTION, CONCENTRATE INTRAVENOUS PRN
Status: CANCELLED | OUTPATIENT
Start: 2018-12-21

## 2018-12-21 RX ORDER — SODIUM CHLORIDE 0.9 % (FLUSH) 0.9 %
10 SYRINGE (ML) INJECTION PRN
Status: CANCELLED | OUTPATIENT
Start: 2018-12-21

## 2018-12-21 RX ORDER — METHYLPREDNISOLONE SODIUM SUCCINATE 125 MG/2ML
125 INJECTION, POWDER, LYOPHILIZED, FOR SOLUTION INTRAMUSCULAR; INTRAVENOUS ONCE
Status: CANCELLED | OUTPATIENT
Start: 2018-12-21 | End: 2018-12-21

## 2018-12-21 RX ORDER — DIPHENHYDRAMINE HYDROCHLORIDE 50 MG/ML
50 INJECTION INTRAMUSCULAR; INTRAVENOUS ONCE
Status: CANCELLED | OUTPATIENT
Start: 2018-12-21 | End: 2018-12-21

## 2018-12-21 RX ORDER — SODIUM CHLORIDE 9 MG/ML
INJECTION, SOLUTION INTRAVENOUS CONTINUOUS
Status: CANCELLED | OUTPATIENT
Start: 2018-12-21

## 2018-12-21 RX ADMIN — IRON SUCROSE 200 MG: 20 INJECTION, SOLUTION INTRAVENOUS at 10:45

## 2018-12-21 NOTE — PROGRESS NOTES
[facility]     Venofer Visit    NAME:  Gatito Montes  DATE OF B IRTH:  1970  MEDICAL RECORD NUMBER:  [id number]  Episode Date:  12/21/2018    Patient arrived to [facility] 58   [] per wheelchair   [x] ambulatory     Alert and oriented X4, accompanied by family. S/P hysterectomy 12/12/18. States pain controlled with tylenol and ibuprofen. States feeling less tired, eating more. Denies difficulty with voiding or bowels. Has the patient had a previous problem with Venofer Infusion? No  Any current infection or illness? No   Patient on antibiotics? No   History of Hypertension?  No     /86   Pulse 79   Temp 97.8 °F (36.6 °C) (Oral)   Resp 16   SpO2 100%   Patient Vitals for the past 2 hrs:   BP Temp Temp src Pulse Resp SpO2   12/21/18 1030 129/86 97.8 °F (36.6 °C) Oral 79 16 100 %       Is the patient experiencing any:  Fatigue: improving  []   None  [x]   Increase over baseline but not altering normal activities  []   Moderate of causing difficulty performing some activities  []   Severe or loss of ability to perform some activities  []   Bedridden or disabling     Dizziness or Lightheadedness: improving  []   None  [x]   No Interference  []   Interferes with functioning but not activities of daily living  []   Interferes with daily activies  []   Bedridden or disabling    Shortness of Breath:   []   None   [x]   Dyspneic on exertion   []   Dyspnea with normal activities  []   Dyspnea at rest    Edema: none Location of edema: nothing    Tachycardia: No    Heart Palpitations: yes occasional, resolves self, short lived     Chest Pain: No      /86   Pulse 79   Temp 97.8 °F (36.6 °C) (Oral)   Resp 16   SpO2 100%   Patient Vitals for the past 2 hrs:   BP Temp Temp src Pulse Resp SpO2   12/21/18 1030 129/86 97.8 °F (36.6 °C) Oral 79 16 100 %       Current Lab Data:  Hemoglobin/Hematocrit:  Lab Results   Component Value Date    HGB 10.3 11/19/2018    HCT 31.8 11/19/2018     IRON:  No results found for: IRON  Iron Saturation:  No results found for: LABIRON  TIBC:  No results found for: TIBC  FERRITIN:    Lab Results   Component Value Date    FERRITIN 4.8 11/06/2018       Dose Administered: 200 mg  Todays dose is number 3 out of 5 ordered for this patient. Response to treatment:  Well tolerated by patient. Education:    Verbalized understanding     Scheduled to return for next dose of Venofer on December 27, 2018.      Electronically signed by Mehnaz Phelps RN on 12/21/2018 at 10:55 AM

## 2018-12-27 ENCOUNTER — HOSPITAL ENCOUNTER (OUTPATIENT)
Dept: INFUSION THERAPY | Age: 48
Setting detail: INFUSION SERIES
Discharge: HOME | End: 2018-12-27
Payer: COMMERCIAL

## 2018-12-27 VITALS
DIASTOLIC BLOOD PRESSURE: 78 MMHG | SYSTOLIC BLOOD PRESSURE: 151 MMHG | TEMPERATURE: 98.2 F | OXYGEN SATURATION: 98 % | HEART RATE: 70 BPM | RESPIRATION RATE: 17 BRPM

## 2018-12-27 DIAGNOSIS — K90.49 MALABSORPTION DUE TO INTOLERANCE, NOT ELSEWHERE CLASSIFIED: ICD-10-CM

## 2018-12-27 DIAGNOSIS — D50.9 IRON DEFICIENCY ANEMIA, UNSPECIFIED IRON DEFICIENCY ANEMIA TYPE: ICD-10-CM

## 2018-12-27 PROCEDURE — 2580000003 HC RX 258: Performed by: INTERNAL MEDICINE

## 2018-12-27 PROCEDURE — 6360000002 HC RX W HCPCS: Performed by: INTERNAL MEDICINE

## 2018-12-27 PROCEDURE — 96365 THER/PROPH/DIAG IV INF INIT: CPT

## 2018-12-27 RX ORDER — EPINEPHRINE 1 MG/ML
0.3 INJECTION, SOLUTION, CONCENTRATE INTRAVENOUS PRN
Status: CANCELLED | OUTPATIENT
Start: 2018-12-27

## 2018-12-27 RX ORDER — SODIUM CHLORIDE 0.9 % (FLUSH) 0.9 %
10 SYRINGE (ML) INJECTION PRN
Status: CANCELLED | OUTPATIENT
Start: 2018-12-27

## 2018-12-27 RX ORDER — SODIUM CHLORIDE 9 MG/ML
INJECTION, SOLUTION INTRAVENOUS CONTINUOUS
Status: CANCELLED | OUTPATIENT
Start: 2018-12-27

## 2018-12-27 RX ORDER — DIPHENHYDRAMINE HYDROCHLORIDE 50 MG/ML
50 INJECTION INTRAMUSCULAR; INTRAVENOUS ONCE
Status: CANCELLED | OUTPATIENT
Start: 2018-12-27 | End: 2018-12-27

## 2018-12-27 RX ORDER — 0.9 % SODIUM CHLORIDE 0.9 %
10 VIAL (ML) INJECTION ONCE
Status: CANCELLED | OUTPATIENT
Start: 2018-12-27 | End: 2018-12-27

## 2018-12-27 RX ORDER — METHYLPREDNISOLONE SODIUM SUCCINATE 125 MG/2ML
125 INJECTION, POWDER, LYOPHILIZED, FOR SOLUTION INTRAMUSCULAR; INTRAVENOUS ONCE
Status: CANCELLED | OUTPATIENT
Start: 2018-12-27 | End: 2018-12-27

## 2018-12-27 RX ORDER — SODIUM CHLORIDE 0.9 % (FLUSH) 0.9 %
10 SYRINGE (ML) INJECTION PRN
Status: DISCONTINUED | OUTPATIENT
Start: 2018-12-27 | End: 2018-12-28 | Stop reason: HOSPADM

## 2018-12-27 RX ADMIN — Medication 10 ML: at 10:29

## 2018-12-27 RX ADMIN — IRON SUCROSE 200 MG: 20 INJECTION, SOLUTION INTRAVENOUS at 10:30

## 2018-12-27 NOTE — PROGRESS NOTES
Outpatient [address] [facility]     Venofer Visit    NAME:  Raya Villarreal  YOB: 1970  MEDICAL RECORD NUMBER:  [id number]  Episode Date:  12/27/2018    Patient arrived to [facility] 58   [] per wheelchair   [x] ambulatory     Has the patient had a previous problem with Venofer Infusion? No  Any current infection or illness? No   Patient on antibiotics? No   History of Hypertension?  Yes      BP (!) 151/78   Pulse 70   Temp 98.2 °F (36.8 °C) (Oral)   Resp 17   SpO2 98%   Patient Vitals for the past 2 hrs:   BP Temp Temp src Pulse Resp SpO2   12/27/18 1120 (!) 151/78 - - 70 - -   12/27/18 1013 (!) 154/90 98.2 °F (36.8 °C) Oral 74 17 98 %       Is the patient experiencing any:  Fatigue:   []   None  []   Increase over baseline but not altering normal activities  [x]   Moderate of causing difficulty performing some activities  []   Severe or loss of ability to perform some activities  []   Bedridden or disabling     Dizziness or Lightheadedness:   [x]   None  []   No Interference  []   Interferes with functioning but not activities of daily living  []   Interferes with daily activies  []   Bedridden or disabling    Shortness of Breath:   []   None   [x]   Dyspneic on exertion   []   Dyspnea with normal activities  []   Dyspnea at rest    Edema: none     Tachycardia: No    Heart Palpitations: No      Chest Pain: No      BP (!) 151/78   Pulse 70   Temp 98.2 °F (36.8 °C) (Oral)   Resp 17   SpO2 98%   Patient Vitals for the past 2 hrs:   BP Temp Temp src Pulse Resp SpO2   12/27/18 1120 (!) 151/78 - - 70 - -   12/27/18 1013 (!) 154/90 98.2 °F (36.8 °C) Oral 74 17 98 %       Current Lab Data:  Hemoglobin/Hematocrit:    Lab Results   Component Value Date    HGB 10.3 11/19/2018    HCT 31.8 11/19/2018     IRON:  No results found for: IRON  Iron Saturation:  No results found for: LABIRON  TIBC:  No results found for: TIBC  FERRITIN:    Lab Results   Component Value Date FERRITIN 4.8 11/06/2018       Dose Administered: 200 mg  Todays dose is number 4 out of 5 ordered for this patient. Response to treatment:  Well tolerated by patient. Education:    Verbalized understanding     Scheduled to return for next dose of Venofer on December 28, 2018.      Electronically signed by Graciela Clark RN on 12/27/2018 at 11:21 AM

## 2018-12-28 ENCOUNTER — HOSPITAL ENCOUNTER (OUTPATIENT)
Dept: INFUSION THERAPY | Age: 48
Setting detail: INFUSION SERIES
Discharge: HOME | End: 2018-12-28
Payer: COMMERCIAL

## 2018-12-28 VITALS
HEART RATE: 75 BPM | RESPIRATION RATE: 18 BRPM | OXYGEN SATURATION: 98 % | SYSTOLIC BLOOD PRESSURE: 147 MMHG | TEMPERATURE: 97.8 F | DIASTOLIC BLOOD PRESSURE: 80 MMHG

## 2018-12-28 DIAGNOSIS — D50.9 IRON DEFICIENCY ANEMIA, UNSPECIFIED IRON DEFICIENCY ANEMIA TYPE: ICD-10-CM

## 2018-12-28 DIAGNOSIS — K90.49 MALABSORPTION DUE TO INTOLERANCE, NOT ELSEWHERE CLASSIFIED: ICD-10-CM

## 2018-12-28 PROCEDURE — 2580000003 HC RX 258: Performed by: INTERNAL MEDICINE

## 2018-12-28 PROCEDURE — 6360000002 HC RX W HCPCS: Performed by: INTERNAL MEDICINE

## 2018-12-28 PROCEDURE — 96365 THER/PROPH/DIAG IV INF INIT: CPT

## 2018-12-28 RX ORDER — EPINEPHRINE 1 MG/ML
0.3 INJECTION, SOLUTION, CONCENTRATE INTRAVENOUS PRN
Status: CANCELLED | OUTPATIENT
Start: 2018-12-28

## 2018-12-28 RX ORDER — 0.9 % SODIUM CHLORIDE 0.9 %
10 VIAL (ML) INJECTION ONCE
Status: CANCELLED | OUTPATIENT
Start: 2018-12-28 | End: 2018-12-28

## 2018-12-28 RX ORDER — SODIUM CHLORIDE 9 MG/ML
INJECTION, SOLUTION INTRAVENOUS CONTINUOUS
Status: CANCELLED | OUTPATIENT
Start: 2018-12-28

## 2018-12-28 RX ORDER — DIPHENHYDRAMINE HYDROCHLORIDE 50 MG/ML
50 INJECTION INTRAMUSCULAR; INTRAVENOUS ONCE
Status: CANCELLED | OUTPATIENT
Start: 2018-12-28 | End: 2018-12-28

## 2018-12-28 RX ORDER — SODIUM CHLORIDE 0.9 % (FLUSH) 0.9 %
10 SYRINGE (ML) INJECTION PRN
Status: CANCELLED | OUTPATIENT
Start: 2018-12-28

## 2018-12-28 RX ORDER — SODIUM CHLORIDE 0.9 % (FLUSH) 0.9 %
10 SYRINGE (ML) INJECTION PRN
Status: DISCONTINUED | OUTPATIENT
Start: 2018-12-28 | End: 2018-12-29 | Stop reason: HOSPADM

## 2018-12-28 RX ORDER — METHYLPREDNISOLONE SODIUM SUCCINATE 125 MG/2ML
125 INJECTION, POWDER, LYOPHILIZED, FOR SOLUTION INTRAMUSCULAR; INTRAVENOUS ONCE
Status: CANCELLED | OUTPATIENT
Start: 2018-12-28 | End: 2018-12-28

## 2018-12-28 RX ADMIN — IRON SUCROSE 200 MG: 20 INJECTION, SOLUTION INTRAVENOUS at 10:07

## 2018-12-28 NOTE — PROGRESS NOTES
Outpatient [address] [facility]     Venofer Visit    NAME:  Zechariah Gasca  YOB: 1970  MEDICAL RECORD NUMBER:  [id number]  Episode Date:  12/28/2018    Patient arrived to [facility] 58   [] per wheelchair   [x] ambulatory     Has the patient had a previous problem with Venofer Infusion? No  Any current infection or illness? No   Patient on antibiotics? No   History of Hypertension?  Yes but on tenormin     /88   Pulse 72   Temp 97.8 °F (36.6 °C) (Oral)   Resp 18   SpO2 98%   Patient Vitals for the past 2 hrs:   BP Temp Temp src Pulse Resp SpO2   12/28/18 0930 130/88 97.8 °F (36.6 °C) Oral 72 18 98 %       Is the patient experiencing any:  Better now since starting on venofer  Fatigue:   []   None  [x]   Increase over baseline but not altering normal activities  []   Moderate of causing difficulty performing some activities  []   Severe or loss of ability to perform some activities  []   Bedridden or disabling     Dizziness or Lightheadedness:  Still has some intermittent dizziness but better now than when she first started venofer  []   None  [x]   No Interference  []   Interferes with functioning but not activities of daily living  []   Interferes with daily activies  []   Bedridden or disabling    Shortness of Breath:   [x]   None   []   Dyspneic on exertion   []   Dyspnea with normal activities  []   Dyspnea at rest    Edema: none Location of edema: nothing    Tachycardia: No    Heart Palpitations: Yes , occassionally      Chest Pain: No      /88   Pulse 72   Temp 97.8 °F (36.6 °C) (Oral)   Resp 18   SpO2 98%   Patient Vitals for the past 2 hrs:   BP Temp Temp src Pulse Resp SpO2   12/28/18 0930 130/88 97.8 °F (36.6 °C) Oral 72 18 98 %       Current Lab Data:  Hemoglobin/Hematocrit:  Lab Results   Component Value Date    HGB 10.3 11/19/2018    HCT 31.8 11/19/2018     IRON:  No results found for: IRON  Iron Saturation:  No results found for: LABIRON  TIBC:  No results found for: TIBC  FERRITIN:    Lab Results   Component Value Date    FERRITIN 4.8 11/06/2018       Dose Administered: 200 mg  Todays dose is number 5 out of 5 ordered for this patient. Response to treatment:  Well tolerated by patient. Education:    Indicates understanding     Patient completed 5 doses of Venofer and no return appointment needed.      Electronically signed by Jessica Jain RN on 12/28/2018 at 11:10 am.

## 2019-01-16 DIAGNOSIS — I10 HYPERTENSION, UNSPECIFIED TYPE: Primary | ICD-10-CM

## 2019-01-16 DIAGNOSIS — I10 HYPERTENSION, UNSPECIFIED TYPE: ICD-10-CM

## 2019-01-16 DIAGNOSIS — D50.9 IRON DEFICIENCY ANEMIA, UNSPECIFIED IRON DEFICIENCY ANEMIA TYPE: ICD-10-CM

## 2019-01-16 DIAGNOSIS — E87.6 HYPOKALEMIA: ICD-10-CM

## 2019-01-16 LAB
ALBUMIN SERPL-MCNC: 4.5 G/DL (ref 3.4–5)
ALP BLD-CCNC: 64 U/L (ref 40–129)
ALT SERPL-CCNC: 15 U/L (ref 10–40)
AST SERPL-CCNC: 24 U/L (ref 15–37)
BILIRUB SERPL-MCNC: 0.5 MG/DL (ref 0–1)
BILIRUBIN DIRECT: <0.2 MG/DL (ref 0–0.3)
BILIRUBIN, INDIRECT: NORMAL MG/DL (ref 0–1)
CHOLESTEROL, TOTAL: 184 MG/DL (ref 0–199)
HDLC SERPL-MCNC: 30 MG/DL (ref 40–60)
LDL CHOLESTEROL CALCULATED: 122 MG/DL
TOTAL PROTEIN: 7.3 G/DL (ref 6.4–8.2)
TRIGL SERPL-MCNC: 158 MG/DL (ref 0–150)
VITAMIN D 25-HYDROXY: 28.9 NG/ML
VLDLC SERPL CALC-MCNC: 32 MG/DL

## 2019-02-07 DIAGNOSIS — D50.9 IRON DEFICIENCY ANEMIA, UNSPECIFIED IRON DEFICIENCY ANEMIA TYPE: ICD-10-CM

## 2019-02-07 DIAGNOSIS — I10 HYPERTENSION, UNSPECIFIED TYPE: ICD-10-CM

## 2019-02-07 LAB
HCT VFR BLD CALC: 35.1 % (ref 36–48)
HEMOGLOBIN: 12.3 G/DL (ref 12–16)
MCH RBC QN AUTO: 32.6 PG (ref 26–34)
MCHC RBC AUTO-ENTMCNC: 35.1 G/DL (ref 31–36)
MCV RBC AUTO: 92.8 FL (ref 80–100)
PDW BLD-RTO: 14.1 % (ref 12.4–15.4)
PLATELET # BLD: 173 K/UL (ref 135–450)
PMV BLD AUTO: 8 FL (ref 5–10.5)
RBC # BLD: 3.79 M/UL (ref 4–5.2)
WBC # BLD: 5.1 K/UL (ref 4–11)

## 2019-04-02 ENCOUNTER — OFFICE VISIT (OUTPATIENT)
Dept: ENT CLINIC | Age: 49
End: 2019-04-02
Payer: COMMERCIAL

## 2019-04-02 VITALS
DIASTOLIC BLOOD PRESSURE: 87 MMHG | SYSTOLIC BLOOD PRESSURE: 137 MMHG | WEIGHT: 132.6 LBS | BODY MASS INDEX: 21.31 KG/M2 | TEMPERATURE: 98.7 F | HEIGHT: 66 IN | HEART RATE: 70 BPM

## 2019-04-02 DIAGNOSIS — H81.319 AUDITORY VERTIGO, UNSPECIFIED LATERALITY: Primary | ICD-10-CM

## 2019-04-02 PROCEDURE — 99243 OFF/OP CNSLTJ NEW/EST LOW 30: CPT | Performed by: OTOLARYNGOLOGY

## 2019-04-02 RX ORDER — MECLIZINE HYDROCHLORIDE 25 MG/1
25 TABLET ORAL 3 TIMES DAILY PRN
COMMUNITY

## 2019-04-02 NOTE — PROGRESS NOTES
CHIEF COMPLAINT: dizziness    HISTORY OF PRESENT ILLNESS:  53 y.o. female referred for consultation who presents with dizziness. Gets episodes of dizziness. Occur intermittently. Lasted 2 days. Previous episode was 12-18 months prior. Also lasted 2 days. True vertigo. No nausea or vomiting. No change in hearing. No ear fullness. No tinnitus. No antecedent URI. PAST MEDICAL HISTORY:   Social History     Tobacco Use   Smoking Status Never Smoker   Smokeless Tobacco Never Used                                                    Social History     Substance and Sexual Activity   Alcohol Use Yes    Comment: socially                                                    Current Outpatient Medications:     meclizine (ANTIVERT) 25 MG tablet, Take 25 mg by mouth 3 times daily as needed, Disp: , Rfl:     famotidine (PEPCID) 20 MG tablet, Take 1 tablet by mouth 2 times daily (Patient taking differently: Take 20 mg by mouth 2 times daily Prn now), Disp: 60 tablet, Rfl: 3    Multiple Vitamins-Minerals (THERAPEUTIC MULTIVITAMIN-MINERALS) tablet, Take 1 tablet by mouth nightly, Disp: , Rfl:     atenolol (TENORMIN) 50 MG tablet, TAKE ONE TABLET BY MOUTH DAILY (Patient taking differently: Take 50 mg by mouth nightly TAKE ONE TABLET BY MOUTH DAILY), Disp: 30 tablet, Rfl: 10    simvastatin (ZOCOR) 10 MG tablet, Take 1 tablet by mouth nightly, Disp: 30 tablet, Rfl: 10    hydrochlorothiazide (HYDRODIURIL) 25 MG tablet, Take 1 tablet by mouth daily.  (Patient taking differently: Take 25 mg by mouth daily as needed (swelling) ), Disp: 90 tablet, Rfl: 3                                                 Past Medical History:   Diagnosis Date    Dizziness     GERD (gastroesophageal reflux disease)     High blood pressure     Hyperlipidemia     Iron deficiency anemia, unspecified 11/29/2018    Malabsorption due to intolerance, not elsewhere classified 11/29/2018    Seasonal allergies Past Surgical History:   Procedure Laterality Date    APPENDECTOMY      HYSTERECTOMY, VAGINAL      TONSILLECTOMY AND ADENOIDECTOMY           REVIEW OF SYSTEMS:  All pertinent positive and negative review of systems included in HPI. Otherwise, all systems are reviewed and negative. PHYSICAL EXAMINATION:   GENERAL: wdwn- no acute distress  COMMUNICATION :  Normal voice  MENTAL STATUS:  Normal  HEAD AND FACE:  Normal  EXTERNAL EARS AND NOSE:  Normal  FACIAL MUSCLES:  Normal  EXTRAOCULAR MUSCLES: Intact  FACE PALPATION:  Negative  OTOSCOPY:  Normal tympanic membranes and middle ear spaces  TUNING FORKS: Rinne ++ Guevara midline at 512 Hz  INTRANASAL:  Septum midline, turbinates normal, meati clear. LIPS, TEETH, GINGIVA:  Normal mucosa  PHARYNX:  Normal  NECK:  No masses or adenopathy  SALIVARY GLANDS:  Normal  THYROID:  Normal    IMPRESSION: 2 episodes of vertigo over the last 18 months with normal otoscopic findings. PLAN: May represent 2 episodes of viral labyrinthitis or may represent widely spaced episodes of Ménière's disease. After discussion with patient, she agrees that with rare presentation of symptoms we will not treat at this time. FOLLOW-UP: If symptoms recur. Will see her acutely at that time.

## 2019-04-19 RX ORDER — ATENOLOL 50 MG/1
TABLET ORAL
Qty: 90 TABLET | Refills: 3 | Status: SHIPPED | OUTPATIENT
Start: 2019-04-19 | End: 2020-05-12

## 2019-06-25 ENCOUNTER — OFFICE VISIT (OUTPATIENT)
Dept: DERMATOLOGY | Age: 49
End: 2019-06-25
Payer: COMMERCIAL

## 2019-06-25 DIAGNOSIS — L81.4 SOLAR LENTIGINOSIS: Primary | ICD-10-CM

## 2019-06-25 PROCEDURE — 1578800: Performed by: DERMATOLOGY

## 2019-06-25 PROCEDURE — 99202 OFFICE O/P NEW SF 15 MIN: CPT | Performed by: DERMATOLOGY

## 2019-06-25 RX ORDER — TRETINOIN 0.5 MG/G
CREAM TOPICAL
Qty: 45 G | Refills: 6 | Status: SHIPPED | OUTPATIENT
Start: 2019-06-25 | End: 2021-04-16 | Stop reason: HOSPADM

## 2019-06-25 NOTE — PROGRESS NOTES
[address] [facility], [facility] ([facility]) Physicians    Previous clinic visit: remote past 2013    CC:  spot on L cheek    HPI:    1.) Here today with few year duration of dark spots on R and L cheek. Is embarrassed by them. Desires improvement. No tx to date. DERM HISTORY:   Personal history of NMSC or MM- no    Family history of NMSC or MM- no   Sunburns easily- Yes   Uses sunscreen- Yes  History of tanning bed use- No    ADDITIONAL HISTORY:    I have reviewed past medical and surgical histories, current medications, allergies, social and family histories as documented in the patient's electronic medical record.      Current Outpatient Medications   Medication Sig Dispense Refill    atenolol (TENORMIN) 50 MG tablet TAKE ONE TABLET BY MOUTH DAILY 90 tablet 3    meclizine (ANTIVERT) 25 MG tablet Take 25 mg by mouth 3 times daily as needed      famotidine (PEPCID) 20 MG tablet Take 1 tablet by mouth 2 times daily (Patient taking differently: Take 20 mg by mouth 2 times daily Prn now) 60 tablet 3    Multiple Vitamins-Minerals (THERAPEUTIC MULTIVITAMIN-MINERALS) tablet Take 1 tablet by mouth nightly      simvastatin (ZOCOR) 10 MG tablet Take 1 tablet by mouth nightly 30 tablet 10    hydrochlorothiazide (HYDRODIURIL) 25 MG tablet Take 1 tablet by mouth daily. (Patient taking differently: Take 25 mg by mouth daily as needed (swelling) ) 90 tablet 3     No current facility-administered medications for this visit.         ROS:    General: No fevers, chills, sweats, unexplained weight loss or weight gain, fatigue, malaise   Skin: Denies additional lesions    Heme: No history of bleeding diatheses    Allergy: Denies seasonal or environmental allergies or other medication allergies     PHYSICAL EXAM:    General: Well-appearing, NAD      Integument: Examination was performed of the following and were unremarkable except as otherwise noted below:psych/neuro, scalp, hair, face, ears, conjunctivae/eyelids, gums/teeth/lips, buccal mucosa, oropharynx, neck     Abnormalities noted include:    1.) R and L lateral cheeks with 2 well defined approx 8-10 mm round tan macules/patches. ASSESSMENT AND PLAN:    1.)  Solar lentigines, cosmetically bothersome to patient:  - Start tret 0.05% cream qhs; edu: photosensitivity, irritation, desquamation, redness   - In-office TCA 35% peel applied directly to each spot today x 2 passes. Edu: irritation, redness, peeling, photosensitivity, need for touch up  - $50 cosmetic fee applied at check out; patient aware of charge  - RTC 2 weeks for eval and free touch up if needed   - Edu: patient regarding sun protection strategies, including use of broad spectrum sunscreen with SPF of at least 30, sun guard clothing, broad brimmed hat, sunglasses, and sun avoidance during peak hours of the day. ABCDE's of melanoma also reviewed    Return to Clinic:  2 weeks  Discussed plan with patient and/or primary caretaker. Patient to call clinic with any questions / concerns. Reviewed side effects of treatment(s) and/or medication(s) with patient and/or primary caretaker.    AVS provided or is available on [employer]   ____________________________________________________________________________   Madison Davis MD, MPH, FAAD  [facility], [address] [id number]

## 2019-06-25 NOTE — PATIENT INSTRUCTIONS
Start a broad spectrum Suncreen with spf of at least 50 daily; prefer ones with zinc oxide and/or titanium dioxide    Start tretinoin 0.05% cream nightly before bed to entire face

## 2019-06-28 ENCOUNTER — PATIENT MESSAGE (OUTPATIENT)
Dept: DERMATOLOGY | Age: 49
End: 2019-06-28

## 2019-06-28 NOTE — TELEPHONE ENCOUNTER
From: Jose Wilslon  To: Murali Norwood MD  Sent: 6/28/2019 8:25 AM EDT  Subject: Visit Follow-Up Question    Hello,  Had a great experience with Dr. Luis Rogers! The only problem is-she stated she was billing me for a chemical peel for two spots on my face for $50 for both-unfortunately I received a bill for the whole face with cost of $385. Can we have this voided and correct cost placed? Thanks so much,  Jose Willson  [phone] if you have any questions.

## 2019-07-11 NOTE — TELEPHONE ENCOUNTER
I sent this through to the billing department asking them to take off the balance for dr. Marcial machado. I informed the patient I\"m taking care of it.

## 2019-07-23 PROBLEM — D12.4 ADENOMATOUS POLYP OF DESCENDING COLON: Status: ACTIVE | Noted: 2019-07-23

## 2019-07-23 PROBLEM — Z80.0 FHX: COLON CANCER: Status: ACTIVE | Noted: 2019-07-23

## 2019-07-24 DIAGNOSIS — E83.19 IRON OVERLOAD: ICD-10-CM

## 2019-07-26 LAB
C282Y HEMOCHROMATOSIS MUT: NEGATIVE
H63D HEMOCHROMATOSIS MUT: NEGATIVE
HEMOCHROMATOSIS GENE ANALYSIS: NORMAL
HFE PCR SPECIMEN: NORMAL
S65C HEMOCHROMATOSIS MUT: NEGATIVE

## 2019-09-09 ENCOUNTER — OFFICE VISIT (OUTPATIENT)
Dept: DERMATOLOGY | Age: 49
End: 2019-09-09

## 2019-09-09 DIAGNOSIS — L81.4 SOLAR LENTIGINOSIS: Primary | ICD-10-CM

## 2019-09-09 PROCEDURE — 99999 PR OFFICE/OUTPT VISIT,PROCEDURE ONLY: CPT | Performed by: DERMATOLOGY

## 2019-09-09 RX ORDER — HYDROQUINONE 40 MG/G
CREAM TOPICAL
Qty: 28 G | Refills: 5 | Status: SHIPPED | OUTPATIENT
Start: 2019-09-09 | End: 2021-04-16 | Stop reason: HOSPADM

## 2019-09-09 NOTE — PROGRESS NOTES
[address] [facility], [facility]) Physicians    Previous clinic visit: June 25 2019    CC:  spot on L cheek re-check s/p TCA peel    HPI:    1.) Here today in follow up s/p TCA 07% application to solar lentigines on R and L lateral cheeks. Lesion on R cheek resolved; L cheek lesion still mostly remains with mild central clearing. DERM HISTORY:   Personal history of NMSC or MM- no    Family history of NMSC or MM- no   Sunburns easily- Yes   Uses sunscreen- Yes  History of tanning bed use- No    ADDITIONAL HISTORY:    I have reviewed past medical and surgical histories, current medications, allergies, social and family histories as documented in the patient's electronic medical record.      Current Outpatient Medications   Medication Sig Dispense Refill    tretinoin (RETIN-A) 0.05 % cream Apply a pea sized amount to the face QHS 45 g 6    atenolol (TENORMIN) 50 MG tablet TAKE ONE TABLET BY MOUTH DAILY 90 tablet 3    meclizine (ANTIVERT) 25 MG tablet Take 25 mg by mouth 3 times daily as needed      famotidine (PEPCID) 20 MG tablet Take 1 tablet by mouth 2 times daily (Patient taking differently: Take 20 mg by mouth 2 times daily Prn now) 60 tablet 3    Multiple Vitamins-Minerals (THERAPEUTIC MULTIVITAMIN-MINERALS) tablet Take 1 tablet by mouth nightly      simvastatin (ZOCOR) 10 MG tablet Take 1 tablet by mouth nightly 30 tablet 10    hydrochlorothiazide (HYDRODIURIL) 25 MG tablet Take 0.5 tablets by mouth daily 90 tablet 0     No current facility-administered medications for this visit.         ROS:    General: No fevers, chills, sweats, unexplained weight loss or weight gain, fatigue, malaise   Skin: Denies additional lesions    Heme: No history of bleeding diatheses    Allergy: Denies seasonal or environmental allergies or other medication allergies     PHYSICAL EXAM:    General: Well-appearing, NAD      Integument: Examination was performed of the following and were unremarkable except as otherwise noted

## 2019-10-02 ENCOUNTER — OFFICE VISIT (OUTPATIENT)
Dept: DERMATOLOGY | Age: 49
End: 2019-10-02
Payer: COMMERCIAL

## 2019-10-02 DIAGNOSIS — L81.4 SOLAR LENTIGINOSIS: Primary | ICD-10-CM

## 2019-10-02 DIAGNOSIS — L72.0 MILIA: ICD-10-CM

## 2019-10-02 PROCEDURE — 99213 OFFICE O/P EST LOW 20 MIN: CPT | Performed by: DERMATOLOGY

## 2019-10-02 PROCEDURE — 17110 DESTRUCTION B9 LES UP TO 14: CPT | Performed by: DERMATOLOGY

## 2019-10-16 ENCOUNTER — OFFICE VISIT (OUTPATIENT)
Dept: DERMATOLOGY | Age: 49
End: 2019-10-16

## 2019-10-16 DIAGNOSIS — L81.4 SOLAR LENTIGINOSIS: Primary | ICD-10-CM

## 2019-10-16 PROCEDURE — 99999 PR OFFICE/OUTPT VISIT,PROCEDURE ONLY: CPT | Performed by: DERMATOLOGY

## 2020-05-12 RX ORDER — ATENOLOL 50 MG/1
TABLET ORAL
Qty: 90 TABLET | Refills: 3 | Status: SHIPPED | OUTPATIENT
Start: 2020-05-12 | End: 2021-05-07

## 2020-10-14 ENCOUNTER — HOSPITAL ENCOUNTER (OUTPATIENT)
Dept: MAMMOGRAPHY | Age: 50
Discharge: HOME | End: 2020-10-14
Payer: COMMERCIAL

## 2020-10-14 PROCEDURE — 77063 BREAST TOMOSYNTHESIS BI: CPT

## 2020-10-27 ENCOUNTER — OFFICE VISIT (OUTPATIENT)
Dept: DERMATOLOGY | Age: 50
End: 2020-10-27
Payer: COMMERCIAL

## 2020-10-27 VITALS — TEMPERATURE: 97.6 F

## 2020-10-27 PROCEDURE — 99214 OFFICE O/P EST MOD 30 MIN: CPT | Performed by: DERMATOLOGY

## 2020-10-27 NOTE — PATIENT INSTRUCTIONS
Sunscreen information    1. Sunscreen should be broad-spectrum protection (protects against UVA and UVB rays), Sun Protection Factor (SPF) 50 or greater, and water-resistant. 2.  Apply the sunscreen to dry skin 15-30 minutes BEFORE going outdoors. Be sure to apply it generously to achieve the UV protection indicated on the product label. 3.  Re-apply sunscreen approximately every two hours or after swimming or sweating heavily according to the directions on the bottle. If the bottle specifies a lower water resistance time, then reapply according to those guidelines (ie water resistance of 60 minutes needs to be applied every 60 minutes). 4.  Skin cancer also can form on the lips. To protect your lips, apply a lip balm or lipstick that contains sunscreen with an SPF of 50 or higher. 5.  There are many types of sunscreen. A.  Creams are best for dry skin and the face. Neutrogena ultra sheer dry touch can be nice for the face. B.  Gels are good for hairy areas, such as the scalp or male chest.    C.  Sticks are good to use around the eyes. D. Sprays are sometimes preferred by parents since they are easy to apply to children, however sprays are NOT generally recommended due to the inability to fully cover most areas adequately. If you are using these, make sure to use enough of these products to cover the entire surface area thoroughly and rub in with your hands after spraying. Do NOT inhale these products or apply near heat, open flame or while smoking. E.  There also are sunscreens made for specific purposes, such as for sensitive skin and babies. Aveeno and Neutrogena are two examples. F. Daily facial moisturizers with spf can also be helpful (Aveeno positively radiant, Aveeno smart essentials, Cetaphil with sunscreen, etc). 6.  Wear protective clothing, such as a long-sleeved shirt, pants, a wide-brimmed hat and sunglasses, where possible.  There are even clothing lines that have special sun-protective clothing and hats such as Coolibar and [place] [employer]). 7.  Seek shade when appropriate, remembering that the sun's rays are strongest between 10 a.m. and 2 p.m. If your shadow is shorter than you are, seek shade. 8.  Use extra caution near water, snow and sand as they reflect the damaging rays of the sun, which can increase your chance of sunburn. 9.  Get vitamin D safely through a healthy diet that may include vitamin supplements. Don't seek the sun. 10.  Avoid tanning beds. Ultraviolet light from the sun and tanning beds can cause skin cancer and wrinkling. If you want to look tan, consider using a self-tanning product, but continue to use sunscreen with it. A-B-C-D-E guide for Melanoma     Characteristics of unusual moles that may indicate melanomas or other skin cancers follow the A-B-C-D-E guide developed by the American Academy of Dermatology:  1. A is for asymmetrical shape. Look for moles with irregular shapes, such as two very different-looking halves. 2. B is for irregular border. Look for moles with irregular, notched or scalloped borders, these may be characteristics of melanomas. 3. C is for changes in color. Look for growths that have many colors or an uneven distribution of color. 4. D is for diameter. Look for new growth in a mole larger than about 1/4 inch (6 millimeters) or about the size of a pencil eraser. 5. E is for evolving. This is the most important one. Look for changes over time, such as a mole that grows in size or that changes color or shape. Darlene How may also evolve to develop new signs and symptoms, such as new itchiness or bleeding. If any of your moles appear to be changing, see your doctor. Other suspicious changes in a mole may include: Scaly skin, itching, spreading of color from the mole into the surrounding skin, oozing or bleeding. Cancerous (malignant) moles vary greatly in appearance.  Some may show all of the changes listed above, while others may have only one or two unusual characteristics. Please call and schedule an appointment if you have any concerns or questions. Skin Cancer Prevention: After Your Visit    Skin cancer is the abnormal growth of cells in the skin. It usually appears as a growth that changes in color, shape, or size. This can be a sore that does not heal or a change in a wart or a mole. Skin cancer is almost always curable when found early and treated. So it is important to see your doctor if you have any of these changes in your skin. Skin cancer is the most common type of cancer. It often appears on areas of the body that have been exposed to the sun, such as the head, face, neck, back, chest, or shoulders. Follow-up care is a key part of your treatment and safety. Be sure to make and go to all appointments, and call your doctor if you are having problems. It's also a good idea to know your test results and keep a list of the medicines you take. How can you care for yourself at home?  - Wear a wide-brimmed hat and long sleeves and pants if you are going to be outdoors for a long time. - Avoid the sun between 10 a.m. and 4 p.m., which is the peak time for UV rays. - Wear sunscreen on exposed skin. Make sure the sunscreen blocks ultraviolet rays (both UVA and UVB) and has a sun protection factor (SPF) of at least 30. Use it every day, even when it is cloudy. Some doctors may recommend a higher SPF, such as 48.  - Do not use tanning booths or sunlamps. - Use lip balm or cream that has sun protection factor (SPF) to protect your lips from getting sunburned or getting cold sores. - Wear sunglasses that block UV rays. When should you call for help? Watch closely for changes in your health, and be sure to contact your doctor if:  - You are concerned about any problem areas on your skin. - You notice a change in a mole or skin growth. For example:  a. It gets bigger.   b. It develops and back). For sticks apply 3 to 4 passes back and forth per area. 6. Get regular check ups. Studies show that those who check their own skin every month, get checked by their spouse, partner, or significant other several times a year (we recommend you do this on your birthdays, anniversary, and big holidays), and get their yearly check by a board certified dermatologist have their skin cancers detected much earlier and have a much higher chance of cure than those who don't.

## 2020-10-29 NOTE — PROGRESS NOTES
Patient's Name: Nerissa Stapleton  MRN: <[id number]>  YOB: 1970  Date of Visit: 10/27/2020  Primary Care Provider: Paige Marrero MD  Referring Provider: No ref. provider found    Subjective:     Chief Complaint   Patient presents with    Skin Exam       History of Present Illness:  Nerissa Stapleton is a 53 y.o. female is a new patient to my clinic, but has been previously evaluated by  Dr. Shea Gaytan on 10/16/19. She presents today for a total body skin examination. She has no lesions of concern today. She does report hyperpigmentation on her forearms, more pronounced this summer. Past Dermatologic History:  Personal history of non melanoma skin cancer: no   Personal history of melanoma: no  Personal history of abnormal/dysplastic moles: no  Personal history of tanning bed use current: no  Personal history of tanning bed use: Yes  Personal history of blistering sunburns: Yes  Personal history of extensive sun exposure: Yes  Burns easily: No  Practicing sun protective habits:  Yes using  sunscreen SPF  30     Social History:   Occupation Current or Former: full time job as Practice manager cardiology [employer] ([address])   Marital status:   Smoking Status: Never smoker  Children: Yes 1 Daughter   1 Son    Type of outdoor activities if any : Runner and watches daughter during soft ball games:       Family Skin Disease History:  Patient reports  a family history of non melanoma skin cancer. basal cell carcinoma  In father  Patient denies  a family history of abnormal moles  Patient denies  an immediate family history of melanoma.      Past Medical History:  Past Medical History:   Diagnosis Date    Dizziness     GERD (gastroesophageal reflux disease)     High blood pressure     Hyperlipidemia     Iron deficiency anemia, unspecified 11/29/2018    Malabsorption due to intolerance, not elsewhere classified 11/29/2018    Seasonal allergies     Solar lentiginosis 6/25/2019 Past Surgical History:  Past Surgical History:   Procedure Laterality Date    APPENDECTOMY      HYSTERECTOMY, VAGINAL  12/2018    severe bleeding and anemia, fibroids still has ovaries,  Aleksandr Karram    TONSILLECTOMY AND ADENOIDECTOMY         Past Family History:  Family History   Problem Relation Age of Onset    High Cholesterol Mother     Stroke Mother 76        CVA    Hypertension Mother     Colon Cancer Father 58    Coronary Art Dis Father 61    High Cholesterol Father     Cancer Father         SCC        Allergies: Allergies   Allergen Reactions    Amoxicillin Hives    Dye [Iodides] Hives     IVP dye    Robitussin Dm [Dextromethorphan-Guaifenesin] Hives       Current Medications:  Current Outpatient Medications   Medication Sig Dispense Refill    atenolol (TENORMIN) 50 MG tablet TAKE ONE TABLET BY MOUTH DAILY 90 tablet 3    hydrochlorothiazide (HYDRODIURIL) 25 MG tablet Take 0.5 tablets by mouth daily 90 tablet 0    meclizine (ANTIVERT) 25 MG tablet Take 25 mg by mouth 3 times daily as needed      famotidine (PEPCID) 20 MG tablet Take 1 tablet by mouth 2 times daily (Patient taking differently: Take 20 mg by mouth 2 times daily Prn now) 60 tablet 3    Multiple Vitamins-Minerals (THERAPEUTIC MULTIVITAMIN-MINERALS) tablet Take 1 tablet by mouth nightly      simvastatin (ZOCOR) 10 MG tablet Take 1 tablet by mouth nightly 30 tablet 10    hydroquinone 4 % cream Apply topically 2 times daily. (Patient not taking: Reported on 10/27/2020) 28 g 5    tretinoin (RETIN-A) 0.05 % cream Apply a pea sized amount to the face QHS (Patient not taking: Reported on 10/27/2020) 45 g 6     No current facility-administered medications for this visit. Review of Systems:  Constitutional: No fevers, chills or recent illness.    Skin: Skin:As per HPI AND otherwise no new, bleeding or symptomatic skin lesions      Objective:     Vitals:    10/27/20 1256   Temp: 97.6 °F (36.4 °C)     Physical Examination:  General: alert, comfortable, no apparent distress, well-appearing  Psych: alert, oriented and pleasant  Neuro: oriented to person, place, and time  Skin: Areas examined: head including face, lips, conjunctiva and lids, neck, hair/scalp, chest, including breasts and axilla, abdomen, back, buttocks, right upper extremity, left upper extremity, right lower extremity, left lower extremity, left hand, right hand, digits and nails, Right foot, Left foot and toe nails      All areas examined were within normal limits except those listed below with the appropriate assessment and plan    Assessment and Plan (with relevant objective exam findings):     1. Solar Lentigo  Location: sun exposed areas, most prominently on the face and shoulders      Objective: Numerous lacy brown macules ranging in size from 2-10 mm diameter. The lentigines seen today are benign in character, caused by the sun, and do not require treatment. However, they do occasionally transform into a malignancy, so the patient needs to monitor for changes. They were educated on what a suspicious change would include, change in size or color, and included education on the ABCDs of melanoma. These lesions can also be removed for cosmetic purposes with chemical peels, laser or cryosurgery for a cosmetic fee if the patient desires. 2. Multiple benign melanocytic nevi   Location: Left buttock, left foot, left axilla, back and abdomen  Objective findings: multiple brown/pink macules and papules without abnormal findings or concerning findings on exam and dermatoscopy    -Counseled the patient that these are benign and need no therapy. Observation for any changes recommended         3.  Mottled hyperpigmentation /Other specified disorders of pigmentation  Location: bilateral dorsal forearms  Findings: hyperpigmented patches with no scale    Discussed that this is possibly due to the use of spray sunscreen with uneven protection and coverage,

## 2020-11-10 DIAGNOSIS — Z00.00 ANNUAL PHYSICAL EXAM: ICD-10-CM

## 2020-11-10 LAB
A/G RATIO: 1.7 (ref 1.1–2.2)
ALBUMIN SERPL-MCNC: 4.4 G/DL (ref 3.4–5)
ALP BLD-CCNC: 69 U/L (ref 40–129)
ALT SERPL-CCNC: 9 U/L (ref 10–40)
ANION GAP SERPL CALCULATED.3IONS-SCNC: 10 MMOL/L (ref 3–16)
AST SERPL-CCNC: 16 U/L (ref 15–37)
BASOPHILS ABSOLUTE: 0.1 K/UL (ref 0–0.2)
BASOPHILS RELATIVE PERCENT: 1.1 %
BILIRUB SERPL-MCNC: 0.6 MG/DL (ref 0–1)
BUN BLDV-MCNC: 14 MG/DL (ref 7–20)
CALCIUM SERPL-MCNC: 9 MG/DL (ref 8.3–10.6)
CHLORIDE BLD-SCNC: 104 MMOL/L (ref 99–110)
CHOLESTEROL, TOTAL: 187 MG/DL (ref 0–199)
CO2: 26 MMOL/L (ref 21–32)
CREAT SERPL-MCNC: 0.8 MG/DL (ref 0.6–1.1)
EOSINOPHILS ABSOLUTE: 0.5 K/UL (ref 0–0.6)
EOSINOPHILS RELATIVE PERCENT: 8.2 %
GFR AFRICAN AMERICAN: >60
GFR NON-AFRICAN AMERICAN: >60
GLOBULIN: 2.6 G/DL
GLUCOSE BLD-MCNC: 104 MG/DL (ref 70–99)
HCT VFR BLD CALC: 36.1 % (ref 36–48)
HDLC SERPL-MCNC: 38 MG/DL (ref 40–60)
HEMOGLOBIN: 12.5 G/DL (ref 12–16)
LDL CHOLESTEROL CALCULATED: 125 MG/DL
LYMPHOCYTES ABSOLUTE: 1.7 K/UL (ref 1–5.1)
LYMPHOCYTES RELATIVE PERCENT: 29.7 %
MCH RBC QN AUTO: 32.4 PG (ref 26–34)
MCHC RBC AUTO-ENTMCNC: 34.5 G/DL (ref 31–36)
MCV RBC AUTO: 93.8 FL (ref 80–100)
MONOCYTES ABSOLUTE: 0.3 K/UL (ref 0–1.3)
MONOCYTES RELATIVE PERCENT: 5.9 %
NEUTROPHILS ABSOLUTE: 3.1 K/UL (ref 1.7–7.7)
NEUTROPHILS RELATIVE PERCENT: 55.1 %
PDW BLD-RTO: 13 % (ref 12.4–15.4)
PLATELET # BLD: 173 K/UL (ref 135–450)
PMV BLD AUTO: 8.4 FL (ref 5–10.5)
POTASSIUM SERPL-SCNC: 4.3 MMOL/L (ref 3.5–5.1)
RBC # BLD: 3.85 M/UL (ref 4–5.2)
SODIUM BLD-SCNC: 140 MMOL/L (ref 136–145)
TOTAL PROTEIN: 7 G/DL (ref 6.4–8.2)
TRIGL SERPL-MCNC: 121 MG/DL (ref 0–150)
TSH SERPL DL<=0.05 MIU/L-ACNC: 1.17 UIU/ML (ref 0.27–4.2)
VLDLC SERPL CALC-MCNC: 24 MG/DL
WBC # BLD: 5.7 K/UL (ref 4–11)

## 2021-01-06 ENCOUNTER — HOSPITAL ENCOUNTER (EMERGENCY)
Age: 51
Discharge: HOME | End: 2021-01-07
Attending: EMERGENCY MEDICINE
Payer: COMMERCIAL

## 2021-01-06 ENCOUNTER — APPOINTMENT (OUTPATIENT)
Dept: GENERAL RADIOLOGY | Age: 51
End: 2021-01-06
Payer: COMMERCIAL

## 2021-01-06 DIAGNOSIS — I47.1 PAROXYSMAL SUPRAVENTRICULAR TACHYCARDIA (HCC): Primary | ICD-10-CM

## 2021-01-06 PROCEDURE — 71046 X-RAY EXAM CHEST 2 VIEWS: CPT

## 2021-01-06 PROCEDURE — 93005 ELECTROCARDIOGRAM TRACING: CPT | Performed by: EMERGENCY MEDICINE

## 2021-01-06 PROCEDURE — 99284 EMERGENCY DEPT VISIT MOD MDM: CPT

## 2021-01-06 RX ORDER — 0.9 % SODIUM CHLORIDE 0.9 %
1000 INTRAVENOUS SOLUTION INTRAVENOUS ONCE
Status: COMPLETED | OUTPATIENT
Start: 2021-01-07 | End: 2021-01-07

## 2021-01-07 VITALS
TEMPERATURE: 98.1 F | SYSTOLIC BLOOD PRESSURE: 143 MMHG | HEART RATE: 75 BPM | DIASTOLIC BLOOD PRESSURE: 94 MMHG | OXYGEN SATURATION: 99 % | RESPIRATION RATE: 17 BRPM

## 2021-01-07 LAB
A/G RATIO: 1.9 (ref 1.1–2.2)
ALBUMIN SERPL-MCNC: 4.6 G/DL (ref 3.4–5)
ALP BLD-CCNC: 92 U/L (ref 40–129)
ALT SERPL-CCNC: 14 U/L (ref 10–40)
ANION GAP SERPL CALCULATED.3IONS-SCNC: 11 MMOL/L (ref 3–16)
AST SERPL-CCNC: 23 U/L (ref 15–37)
BASOPHILS ABSOLUTE: 0 K/UL (ref 0–0.2)
BASOPHILS RELATIVE PERCENT: 0.5 %
BILIRUB SERPL-MCNC: 0.3 MG/DL (ref 0–1)
BUN BLDV-MCNC: 15 MG/DL (ref 7–20)
CALCIUM SERPL-MCNC: 9.3 MG/DL (ref 8.3–10.6)
CHLORIDE BLD-SCNC: 103 MMOL/L (ref 99–110)
CO2: 25 MMOL/L (ref 21–32)
CREAT SERPL-MCNC: 0.8 MG/DL (ref 0.6–1.1)
EKG ATRIAL RATE: 102 BPM
EKG DIAGNOSIS: NORMAL
EKG P AXIS: 71 DEGREES
EKG P-R INTERVAL: 164 MS
EKG Q-T INTERVAL: 364 MS
EKG QRS DURATION: 90 MS
EKG QTC CALCULATION (BAZETT): 474 MS
EKG R AXIS: -3 DEGREES
EKG T AXIS: 45 DEGREES
EKG VENTRICULAR RATE: 102 BPM
EOSINOPHILS ABSOLUTE: 0.3 K/UL (ref 0–0.6)
EOSINOPHILS RELATIVE PERCENT: 3.2 %
GFR AFRICAN AMERICAN: >60
GFR NON-AFRICAN AMERICAN: >60
GLOBULIN: 2.4 G/DL
GLUCOSE BLD-MCNC: 128 MG/DL (ref 70–99)
HCT VFR BLD CALC: 35.8 % (ref 36–48)
HEMOGLOBIN: 12.6 G/DL (ref 12–16)
LYMPHOCYTES ABSOLUTE: 1.4 K/UL (ref 1–5.1)
LYMPHOCYTES RELATIVE PERCENT: 14.7 %
MCH RBC QN AUTO: 32.3 PG (ref 26–34)
MCHC RBC AUTO-ENTMCNC: 35.4 G/DL (ref 31–36)
MCV RBC AUTO: 91.3 FL (ref 80–100)
MONOCYTES ABSOLUTE: 0.5 K/UL (ref 0–1.3)
MONOCYTES RELATIVE PERCENT: 5.7 %
NEUTROPHILS ABSOLUTE: 7.3 K/UL (ref 1.7–7.7)
NEUTROPHILS RELATIVE PERCENT: 75.9 %
PDW BLD-RTO: 13 % (ref 12.4–15.4)
PLATELET # BLD: 179 K/UL (ref 135–450)
PMV BLD AUTO: 8 FL (ref 5–10.5)
POTASSIUM REFLEX MAGNESIUM: 3.8 MMOL/L (ref 3.5–5.1)
PRO-BNP: 117 PG/ML (ref 0–124)
RBC # BLD: 3.92 M/UL (ref 4–5.2)
SODIUM BLD-SCNC: 139 MMOL/L (ref 136–145)
TOTAL PROTEIN: 7 G/DL (ref 6.4–8.2)
TROPONIN: <0.01 NG/ML
TROPONIN: <0.01 NG/ML
WBC # BLD: 9.6 K/UL (ref 4–11)

## 2021-01-07 PROCEDURE — 84484 ASSAY OF TROPONIN QUANT: CPT

## 2021-01-07 PROCEDURE — 85025 COMPLETE CBC W/AUTO DIFF WBC: CPT

## 2021-01-07 PROCEDURE — 83880 ASSAY OF NATRIURETIC PEPTIDE: CPT

## 2021-01-07 PROCEDURE — 36415 COLL VENOUS BLD VENIPUNCTURE: CPT

## 2021-01-07 PROCEDURE — 93010 ELECTROCARDIOGRAM REPORT: CPT | Performed by: INTERNAL MEDICINE

## 2021-01-07 PROCEDURE — 2580000003 HC RX 258: Performed by: EMERGENCY MEDICINE

## 2021-01-07 PROCEDURE — 80053 COMPREHEN METABOLIC PANEL: CPT

## 2021-01-07 RX ADMIN — SODIUM CHLORIDE 1000 ML: 9 INJECTION, SOLUTION INTRAVENOUS at 00:50

## 2021-01-07 NOTE — ED NOTES
Bed: A-17  Expected date:   Expected time:   Means of arrival:   Comments:  39yo svt     [id number] Antonio Pineda RN  01/06/21 1606

## 2021-01-07 NOTE — ED PROVIDER NOTES
[facility]      Pt Name: Bridget Jules  MRN: [id number]  [place] 1970  Date of evaluation: 1/6/2021  Provider: Polly Simental MD    CHIEF COMPLAINT       Chief Complaint   Patient presents with    Tachycardia     Pt in via EMS after feeling her \"heart fluttering earlier\". EMS reports pt was in SVT on their arrival. EMS states that cardioversion was \"achieved en route with 6 of adenosine\". Pt alert and oriented on arrival with no signs of distress noted at this time. HISTORY OF PRESENT ILLNESS   (Location/Symptom, Timing/Onset,Context/Setting, Quality, Duration, Modifying Factors, Severity)  Note limiting factors. Bridget Jules is a 53 y.o. female who presents to the emergency department for evaluation of heart palpitations. Patient reports that she was watching TV this evening and had sudden onset heart palpitations associated with lightheadedness, chest pain, and shortness of breath. She describes the chest pain as midsternal, pressure-like, nonradiating. She works as a practice manager for a cardiologist so she tried to bear down, but her symptoms not resolve therefore she called EMS. EMS found the patient to be in SVT with heart rate up to 220s. She was treated with 6 mg of adenosine prehospital and had conversion to sinus rhythm. Heart rate is in the 100s to 110s at the time of arrival here. She has no residual chest pain, palpitations, or shortness of breath at the time of my assessment. She states that she feels \"jittery\" but has no other complaints at the time of my assessment. NursingNotes were reviewed. REVIEW OF SYSTEMS    (2-9 systems for level 4, 10 or more for level 5)       Constitutional: No fever or chills. Eye: No visual disturbances. No eye pain. Ear/Nose/Mouth/Throat: No nasal congestion. No sore throat.   Respiratory: No cough, No shortness of breath, No sputum production. Cardiovascular: No chest pain. No palpitations. Gastrointestinal: No abdominal pain. No nausea or vomiting  Genitourinary: No dysuria. No hematuria. Hematology/Lymphatics: No bleeding or bruising tendency. Immunologic: No malaise. No swollen glands. Musculoskeletal: No back pain. No joint pain. Integumentary: No rash. No abrasions. Neurologic: No headache. No focal numbness or weakness. PAST MEDICAL HISTORY     Past Medical History:   Diagnosis Date    Dizziness     GERD (gastroesophageal reflux disease)     High blood pressure     Hyperlipidemia     Iron deficiency anemia, unspecified 11/29/2018    Malabsorption due to intolerance, not elsewhere classified 11/29/2018    Seasonal allergies     Solar lentiginosis 6/25/2019         SURGICALHISTORY       Past Surgical History:   Procedure Laterality Date    APPENDECTOMY      HYSTERECTOMY, VAGINAL  12/2018    severe bleeding and anemia, fibroids still has ovaries,  Aleksandr Karram    TONSILLECTOMY AND ADENOIDECTOMY           CURRENT MEDICATIONS       Discharge Medication List as of 1/7/2021  3:48 AM      CONTINUE these medications which have NOT CHANGED    Details   atenolol (TENORMIN) 50 MG tablet TAKE ONE TABLET BY MOUTH DAILY, Disp-90 tablet, R-3Normal      hydroquinone 4 % cream Apply topically 2 times daily. , Disp-28 g, R-5, Print      hydrochlorothiazide (HYDRODIURIL) 25 MG tablet Take 0.5 tablets by mouth daily, Disp-90 tablet, R-0Normal      tretinoin (RETIN-A) 0.05 % cream Apply a pea sized amount to the face QHS, Disp-45 g, R-6, Print      meclizine (ANTIVERT) 25 MG tablet Take 25 mg by mouth 3 times daily as neededHistorical Med      famotidine (PEPCID) 20 MG tablet Take 1 tablet by mouth 2 times daily, Disp-60 tablet, R-3Normal      Multiple Vitamins-Minerals (THERAPEUTIC MULTIVITAMIN-MINERALS) tablet Take 1 tablet by mouth nightlyHistorical Med      simvastatin (ZOCOR) 10 MG tablet Take 1 tablet by mouth nightly, Disp-30 tablet, R-10Normal             ALLERGIES     Amoxicillin, Dye [iodides], and Robitussin dm [dextromethorphan-guaifenesin]    FAMILY HISTORY       Family History   Problem Relation Age of Onset    High Cholesterol Mother     Stroke Mother 76        CVA    Hypertension Mother     Colon Cancer Father 58    Coronary Art Dis Father 61    High Cholesterol Father     Cancer Father         SCC           SOCIAL HISTORY       Social History     Socioeconomic History    Marital status:      Spouse name: None    Number of children: 2    Years of education: None    Highest education level: None   Occupational History    Occupation:  [employer]     Employer: [employer]   Social Needs    Financial resource strain: None    Food insecurity     Worry: None     Inability: None    Transportation needs     Medical: None     Non-medical: None   Tobacco Use    Smoking status: Never Smoker    Smokeless tobacco: Never Used   Substance and Sexual Activity    Alcohol use:  Yes     Alcohol/week: 0.0 - 4.0 standard drinks     Comment: socially    Drug use: No    Sexual activity: None   Lifestyle    Physical activity     Days per week: None     Minutes per session: None    Stress: None   Relationships    Social connections     Talks on phone: None     Gets together: None     Attends Alevism service: None     Active member of club or organization: None     Attends meetings of clubs or organizations: None     Relationship status: None    Intimate partner violence     Fear of current or ex partner: None     Emotionally abused: None     Physically abused: None     Forced sexual activity: None   Other Topics Concern    None   Social History Narrative    None       SCREENINGS             PHYSICAL EXAM    (up to 7 for level 4, 8 or more for level 5)     ED Triage Vitals [01/06/21 2305]   BP Temp Temp src Pulse Resp SpO2 Height Weight   (!) 182/106 -- -- 112 19 98 % -- --       General: Alert and oriented, No acute distress. Eye: Normal conjunctiva. Pupils equal and reactive. HENT: Oral mucosa is moist.  Respiratory: Lungs are clear to auscultation, Respirations are non-labored. Cardiovascular: Normal rate, Regular rhythm. Gastrointestinal: Soft, Non-tender, Non-distended. Musculoskeletal: No swelling. Integumentary: Warm, Dry. Neurologic: Alert, Oriented, No focal defects. Psychiatric: Cooperative. DIAGNOSTIC RESULTS     EKG: All EKG's are interpreted by the Emergency Department Physician who either signs or Co-signsthis chart in the absence of a cardiologist.    Electrocardiogram (ECG) 1/6/2021 2306  RATE: 102 bpm  RHYTHM: normal sinus  AXIS: normal  INTERVALS: normal  ST-T WAVE CHANGES: No evidence of ST segment elevation or T-wave inversion  Prior for comparison 11/5/2018    RADIOLOGY:   Non-plain filmimages such as CT, Ultrasound and MRI are read by the radiologist. Plain radiographic images are visualized and preliminarily interpreted by the emergency physician with the below findings:      Interpretation per the Radiologist below, if available at the time ofthis note:    XR CHEST (2 VW)   Final Result   No acute process.                ED BEDSIDE ULTRASOUND:   Performed by ED Physician - none    LABS:  Labs Reviewed   CBC WITH AUTO DIFFERENTIAL - Abnormal; Notable for the following components:       Result Value    RBC 3.92 (*)     Hematocrit 35.8 (*)     All other components within normal limits    Narrative:     Performed at:  [facility]  [address] [facility] [address]   Phone [phone]   COMPREHENSIVE METABOLIC PANEL W/ REFLEX TO MG FOR LOW K - Abnormal; Notable for the following components:    Glucose 128 (*)     All other components within normal limits    Narrative:     Performed at:  [facility]  [address] [facility] [address]   Phone [phone]   TROPONIN    Narrative:     Performed at:  [facility]  [address] [address]   Phone [phone]   BRAIN NATRIURETIC PEPTIDE    Narrative:     Performed at:  [facility]  [address] [address]   Phone [phone]   TROPONIN    Narrative:     Performed at:  [facility]  [address] [address]   Phone [phone]       All other labs were within normal range or not returned as of this dictation. EMERGENCY DEPARTMENT COURSE and DIFFERENTIAL DIAGNOSIS/MDM:   Vitals:    Vitals:    01/06/21 2305 01/07/21 0050 01/07/21 0350   BP: (!) 182/106 131/88 (!) 143/94   Pulse: 112 85 75   Resp: 19 16 17   Temp:   98.1 °F (36.7 °C)   TempSrc:   Oral   SpO2: 98% 100% 99%     HEART Score:  History: Slightly suspicious -0  EKG: normal -0  Age: 41-57  -1  Risk factors (Hypertension, high cholesterol, diabetes, obesity, smoking, family history, known CAD, PAD) greater than 3 risk factors - 2  Initial troponin: normal - 0  Total heart score: 3      Medical decision making: This is a 78-year-old female who presents for evaluation after palpitations at home, found to be in SVT by EMS and converted after 6 mg of adenosine prehospital.  Patient did have chest pain associated with this episode, which is resolved by the time she presents to the emergency department. She endorses a sensation of \"jitteriness\", but no other complaints at the time of my initial assessment. On arrival, patient is hypertensive at 182/106, mildly tachycardic to 112, but appears sinus rhythm on the monitor. Otherwise normal vital signs and oxygen saturation. Physical exam is unremarkable. Considered electrolyte abnormality, heart strain from SVT cardiac ischemia, arrhythmia. EKG shows sinus rhythm at a rate of 102 bpm, no evidence of arrhythmia or ischemia. CBC is unremarkable. CMP is within normal limits.   Initial troponin and BNP were normal.  Heart score of 3 and patient has no active chest pain at the time of arrival however, we did observe the patient in the emergency department and obtain a delta troponin which remained negative. At this time, patient is asymptomatic, tachycardia resolved after administration of IV fluids. I feel that she can be safely discharged with close cardiology follow-up. Patient does work for cardiologist, and believes she will be able to get an appointment within 1 to 2 days. She is counseled to return to the emergency department if she develops recurrent palpitations, difficulty breathing, chest pain, or other new or concerning symptoms. Patient is understanding, agreeable with plan of care, discharged in stable condition. CRITICAL CARE TIME   Total Critical Care time was 0 minutes, excluding separately reportable procedures. There was a high probability of clinically significant/life threatening deterioration in the patient's condition which required my urgent intervention. CONSULTS:  None    PROCEDURES:  Unless otherwise noted below, none         FINAL IMPRESSION      1.  Paroxysmal supraventricular tachycardia [facility])          DISPOSITION/PLAN   DISPOSITION Decision To Discharge 01/07/2021 03:42:10 AM      PATIENT REFERRED TO:  Niyah Jones MD  [facility] [address]  [phone]    Schedule an appointment as soon as possible for a visit in 2 days      Your cardiologist    Schedule an appointment as soon as possible for a visit in 1 day        DISCHARGE MEDICATIONS:  Discharge Medication List as of 1/7/2021  3:48 AM             (Please note that portions of this note were completed with a voice recognition program.Efforts were made to edit the dictations but occasionally words are mis-transcribed.)    Lopez Michael MD (electronically signed)  Attending Emergency Physician        Lopez Michael MD  01/07/21 9598

## 2021-03-30 ENCOUNTER — TELEPHONE (OUTPATIENT)
Dept: CARDIOLOGY CLINIC | Age: 51
End: 2021-03-30

## 2021-03-30 DIAGNOSIS — I47.1 SVT (SUPRAVENTRICULAR TACHYCARDIA) (HCC): Primary | ICD-10-CM

## 2021-03-30 NOTE — LETTER
[facility]  [facility] Procedure Sheet  3/30/21    Yunior Gustafson  1970    Physician: Dr. Marcos York    EP Procedures   Pacemaker implant x EP Study    ICD implant  Atrial flutter ablation     Biv implant  Atrial fibrillation ablation    Generator Change x SVT ablation    Lead revision  VT ablation    Lead extraction +/- upgrade  AVN ablation    Loop implant  Cardioversion     Other:   DAJUAN     Equipment   Medtronic   CHET Mapping System    [facility] [facility] x [address]  CryoAblation    Biotronik  Laser Lead Extraction    Special Equipment       EP Procedures Scheduling Request  Time Requested  1130   Specific Day 4/16/21   Anesthesia xxx   CT surgery backup    Location [facility]     Pre-Procedure Labs / Imaging   PT/INR  Type & cross    CBC  Units PRBC    BMP/Mg  Units FFP    Venogram  CXR    Echo  Pulmonary CTA for Pulmonary vein mapping     Patient Instructions

## 2021-03-30 NOTE — TELEPHONE ENCOUNTER
Spoke with pt. Scheduled SVT ablation for 4/16 at 1130. Reviewed complete instructions with pt. Verbalized understanding. Electrophysiology Study (EPS) and Catheter Ablation    Date of Procedure: April 16, 2021    Time of Arrival: 9:30 am    Cardiologist performing procedure:   Dr. Gianna Martinez    · Arrive at [facility] through the main entrance. Check in at the Outpatient Diagnostic desk on the 1st floor. · Do not eat or drink anything after midnight the night before the procedure. · You may brush your teeth and rinse the morning of the procedure. · Take ALL of your routine medications the morning of the procedure. However, if you are taking diabetic medications, please HOLD on the day of the procedure (including insulin). If you take Lantus insulin, take HALF of your usual dose the night before. · Do NOT stop taking aspirin, Plavix, coumadin, Eliquis, Xarelto, or Pradaxa (any blood thinners). However, do not take any blood thinners the morning of the procedure. · Do not apply any lotion, powder, or deodorant the morning of the procedure. · Please bring a list of your medications to the hospital with you. · You must have someone available to drive you home the same day. It is recommended that you do not drive for 24 hours after the procedure. You will also need someone with you at home the night of the procedure. · Get tested for COVID on 4/12/21 at the [facility] testing center (located outside the hospital). They are open 8 am -3 pm.    · If you are unable to make this appointment, please call [facility] at [phone].

## 2021-03-31 ENCOUNTER — PREP FOR PROCEDURE (OUTPATIENT)
Dept: CARDIOLOGY CLINIC | Age: 51
End: 2021-03-31

## 2021-03-31 RX ORDER — SODIUM CHLORIDE 9 MG/ML
INJECTION, SOLUTION INTRAVENOUS CONTINUOUS
Status: CANCELLED | OUTPATIENT
Start: 2021-03-31

## 2021-03-31 RX ORDER — SODIUM CHLORIDE 0.9 % (FLUSH) 0.9 %
10 SYRINGE (ML) INJECTION EVERY 12 HOURS SCHEDULED
Status: CANCELLED | OUTPATIENT
Start: 2021-03-31

## 2021-03-31 RX ORDER — SODIUM CHLORIDE 0.9 % (FLUSH) 0.9 %
10 SYRINGE (ML) INJECTION PRN
Status: CANCELLED | OUTPATIENT
Start: 2021-03-31

## 2021-04-12 ENCOUNTER — OFFICE VISIT (OUTPATIENT)
Dept: PRIMARY CARE CLINIC | Age: 51
End: 2021-04-12
Payer: COMMERCIAL

## 2021-04-12 DIAGNOSIS — Z01.818 PREOP EXAMINATION: Primary | ICD-10-CM

## 2021-04-12 LAB — SARS-COV-2: NOT DETECTED

## 2021-04-12 PROCEDURE — G8421 BMI NOT CALCULATED: HCPCS | Performed by: NURSE PRACTITIONER

## 2021-04-12 PROCEDURE — 99211 OFF/OP EST MAY X REQ PHY/QHP: CPT | Performed by: NURSE PRACTITIONER

## 2021-04-12 PROCEDURE — G8428 CUR MEDS NOT DOCUMENT: HCPCS | Performed by: NURSE PRACTITIONER

## 2021-04-16 ENCOUNTER — HOSPITAL ENCOUNTER (OUTPATIENT)
Dept: CARDIAC CATH/INVASIVE PROCEDURES | Age: 51
Discharge: HOME | End: 2021-04-18
Payer: COMMERCIAL

## 2021-04-16 ENCOUNTER — ANESTHESIA (OUTPATIENT)
Dept: CARDIAC CATH/INVASIVE PROCEDURES | Age: 51
End: 2021-04-16

## 2021-04-16 ENCOUNTER — ANESTHESIA EVENT (OUTPATIENT)
Dept: CARDIAC CATH/INVASIVE PROCEDURES | Age: 51
End: 2021-04-16

## 2021-04-16 VITALS — HEIGHT: 66 IN | WEIGHT: 130 LBS | BODY MASS INDEX: 20.89 KG/M2 | TEMPERATURE: 99.3 F

## 2021-04-16 VITALS — DIASTOLIC BLOOD PRESSURE: 85 MMHG | SYSTOLIC BLOOD PRESSURE: 144 MMHG | OXYGEN SATURATION: 99 %

## 2021-04-16 LAB
ANION GAP SERPL CALCULATED.3IONS-SCNC: 13 MMOL/L (ref 3–16)
BUN BLDV-MCNC: 11 MG/DL (ref 7–20)
CALCIUM SERPL-MCNC: 10.1 MG/DL (ref 8.3–10.6)
CHLORIDE BLD-SCNC: 102 MMOL/L (ref 99–110)
CO2: 24 MMOL/L (ref 21–32)
CREAT SERPL-MCNC: 0.9 MG/DL (ref 0.6–1.1)
EKG ATRIAL RATE: 84 BPM
EKG DIAGNOSIS: NORMAL
EKG P AXIS: 71 DEGREES
EKG P-R INTERVAL: 146 MS
EKG Q-T INTERVAL: 390 MS
EKG QRS DURATION: 82 MS
EKG QTC CALCULATION (BAZETT): 460 MS
EKG R AXIS: -6 DEGREES
EKG T AXIS: 49 DEGREES
EKG VENTRICULAR RATE: 84 BPM
GFR AFRICAN AMERICAN: >60
GFR NON-AFRICAN AMERICAN: >60
GLUCOSE BLD-MCNC: 104 MG/DL (ref 70–99)
HCT VFR BLD CALC: 39.8 % (ref 36–48)
HEMOGLOBIN: 13.8 G/DL (ref 12–16)
INR BLD: 1.04 (ref 0.86–1.14)
MCH RBC QN AUTO: 32.2 PG (ref 26–34)
MCHC RBC AUTO-ENTMCNC: 34.7 G/DL (ref 31–36)
MCV RBC AUTO: 92.7 FL (ref 80–100)
PDW BLD-RTO: 13.6 % (ref 12.4–15.4)
PLATELET # BLD: 165 K/UL (ref 135–450)
PMV BLD AUTO: 8 FL (ref 5–10.5)
POTASSIUM SERPL-SCNC: 3.6 MMOL/L (ref 3.5–5.1)
PROTHROMBIN TIME: 12.1 SEC (ref 10–13.2)
RBC # BLD: 4.3 M/UL (ref 4–5.2)
SODIUM BLD-SCNC: 139 MMOL/L (ref 136–145)
WBC # BLD: 5 K/UL (ref 4–11)

## 2021-04-16 PROCEDURE — C1760 CLOSURE DEV, VASC: HCPCS

## 2021-04-16 PROCEDURE — 2500000003 HC RX 250 WO HCPCS

## 2021-04-16 PROCEDURE — C1759 CATH, INTRA ECHOCARDIOGRAPHY: HCPCS

## 2021-04-16 PROCEDURE — 3700000000 HC ANESTHESIA ATTENDED CARE

## 2021-04-16 PROCEDURE — 93662 INTRACARDIAC ECG (ICE): CPT | Performed by: INTERNAL MEDICINE

## 2021-04-16 PROCEDURE — 93655 ICAR CATH ABLTJ DSCRT ARRHYT: CPT | Performed by: INTERNAL MEDICINE

## 2021-04-16 PROCEDURE — 2709999900 HC NON-CHARGEABLE SUPPLY

## 2021-04-16 PROCEDURE — 93613 INTRACARDIAC EPHYS 3D MAPG: CPT

## 2021-04-16 PROCEDURE — 93623 PRGRMD STIMJ&PACG IV RX NFS: CPT | Performed by: INTERNAL MEDICINE

## 2021-04-16 PROCEDURE — 93010 ELECTROCARDIOGRAM REPORT: CPT | Performed by: INTERNAL MEDICINE

## 2021-04-16 PROCEDURE — 93653 COMPRE EP EVAL TX SVT: CPT | Performed by: INTERNAL MEDICINE

## 2021-04-16 PROCEDURE — C1732 CATH, EP, DIAG/ABL, 3D/VECT: HCPCS

## 2021-04-16 PROCEDURE — 2580000003 HC RX 258: Performed by: NURSE ANESTHETIST, CERTIFIED REGISTERED

## 2021-04-16 PROCEDURE — 93662 INTRACARDIAC ECG (ICE): CPT

## 2021-04-16 PROCEDURE — 85027 COMPLETE CBC AUTOMATED: CPT

## 2021-04-16 PROCEDURE — 85610 PROTHROMBIN TIME: CPT

## 2021-04-16 PROCEDURE — 6360000002 HC RX W HCPCS

## 2021-04-16 PROCEDURE — 93613 INTRACARDIAC EPHYS 3D MAPG: CPT | Performed by: INTERNAL MEDICINE

## 2021-04-16 PROCEDURE — 6360000002 HC RX W HCPCS: Performed by: NURSE ANESTHETIST, CERTIFIED REGISTERED

## 2021-04-16 PROCEDURE — 80048 BASIC METABOLIC PNL TOTAL CA: CPT

## 2021-04-16 PROCEDURE — 3700000001 HC ADD 15 MINUTES (ANESTHESIA)

## 2021-04-16 PROCEDURE — 93621 COMP EP EVL L PAC&REC C SINS: CPT

## 2021-04-16 PROCEDURE — C1730 CATH, EP, 19 OR FEW ELECT: HCPCS

## 2021-04-16 PROCEDURE — 2500000003 HC RX 250 WO HCPCS: Performed by: NURSE ANESTHETIST, CERTIFIED REGISTERED

## 2021-04-16 PROCEDURE — 93005 ELECTROCARDIOGRAM TRACING: CPT | Performed by: INTERNAL MEDICINE

## 2021-04-16 PROCEDURE — 93621 COMP EP EVL L PAC&REC C SINS: CPT | Performed by: INTERNAL MEDICINE

## 2021-04-16 PROCEDURE — 93653 COMPRE EP EVAL TX SVT: CPT

## 2021-04-16 PROCEDURE — C1894 INTRO/SHEATH, NON-LASER: HCPCS

## 2021-04-16 PROCEDURE — 93623 PRGRMD STIMJ&PACG IV RX NFS: CPT

## 2021-04-16 RX ORDER — PROPOFOL 10 MG/ML
INJECTION, EMULSION INTRAVENOUS CONTINUOUS PRN
Status: DISCONTINUED | OUTPATIENT
Start: 2021-04-16 | End: 2021-04-16 | Stop reason: SDUPTHER

## 2021-04-16 RX ORDER — SODIUM CHLORIDE 9 MG/ML
INJECTION, SOLUTION INTRAVENOUS CONTINUOUS
Status: DISCONTINUED | OUTPATIENT
Start: 2021-04-16 | End: 2021-04-19 | Stop reason: HOSPADM

## 2021-04-16 RX ORDER — FENTANYL CITRATE 50 UG/ML
INJECTION, SOLUTION INTRAMUSCULAR; INTRAVENOUS PRN
Status: DISCONTINUED | OUTPATIENT
Start: 2021-04-16 | End: 2021-04-16 | Stop reason: SDUPTHER

## 2021-04-16 RX ORDER — PROPOFOL 10 MG/ML
INJECTION, EMULSION INTRAVENOUS PRN
Status: DISCONTINUED | OUTPATIENT
Start: 2021-04-16 | End: 2021-04-16 | Stop reason: SDUPTHER

## 2021-04-16 RX ORDER — SODIUM CHLORIDE 9 MG/ML
25 INJECTION, SOLUTION INTRAVENOUS PRN
Status: DISCONTINUED | OUTPATIENT
Start: 2021-04-16 | End: 2021-04-19 | Stop reason: HOSPADM

## 2021-04-16 RX ORDER — SODIUM CHLORIDE 0.9 % (FLUSH) 0.9 %
5-40 SYRINGE (ML) INJECTION PRN
Status: DISCONTINUED | OUTPATIENT
Start: 2021-04-16 | End: 2021-04-16 | Stop reason: SDUPTHER

## 2021-04-16 RX ORDER — SODIUM CHLORIDE, SODIUM LACTATE, POTASSIUM CHLORIDE, CALCIUM CHLORIDE 600; 310; 30; 20 MG/100ML; MG/100ML; MG/100ML; MG/100ML
INJECTION, SOLUTION INTRAVENOUS CONTINUOUS PRN
Status: DISCONTINUED | OUTPATIENT
Start: 2021-04-16 | End: 2021-04-16 | Stop reason: SDUPTHER

## 2021-04-16 RX ORDER — MIDAZOLAM HYDROCHLORIDE 1 MG/ML
INJECTION INTRAMUSCULAR; INTRAVENOUS PRN
Status: DISCONTINUED | OUTPATIENT
Start: 2021-04-16 | End: 2021-04-16 | Stop reason: SDUPTHER

## 2021-04-16 RX ORDER — SODIUM CHLORIDE 0.9 % (FLUSH) 0.9 %
5-40 SYRINGE (ML) INJECTION EVERY 12 HOURS SCHEDULED
Status: DISCONTINUED | OUTPATIENT
Start: 2021-04-16 | End: 2021-04-16 | Stop reason: SDUPTHER

## 2021-04-16 RX ORDER — ACETAMINOPHEN 325 MG/1
650 TABLET ORAL EVERY 4 HOURS PRN
Status: DISCONTINUED | OUTPATIENT
Start: 2021-04-16 | End: 2021-04-19 | Stop reason: HOSPADM

## 2021-04-16 RX ORDER — SODIUM CHLORIDE 0.9 % (FLUSH) 0.9 %
10 SYRINGE (ML) INJECTION EVERY 12 HOURS SCHEDULED
Status: DISCONTINUED | OUTPATIENT
Start: 2021-04-16 | End: 2021-04-19 | Stop reason: HOSPADM

## 2021-04-16 RX ORDER — SODIUM CHLORIDE 0.9 % (FLUSH) 0.9 %
10 SYRINGE (ML) INJECTION PRN
Status: DISCONTINUED | OUTPATIENT
Start: 2021-04-16 | End: 2021-04-19 | Stop reason: HOSPADM

## 2021-04-16 RX ADMIN — MIDAZOLAM HYDROCHLORIDE 2 MG: 2 INJECTION, SOLUTION INTRAMUSCULAR; INTRAVENOUS at 14:57

## 2021-04-16 RX ADMIN — PROPOFOL 120 MCG/KG/MIN: 10 INJECTION, EMULSION INTRAVENOUS at 12:42

## 2021-04-16 RX ADMIN — PROPOFOL 100 MG: 10 INJECTION, EMULSION INTRAVENOUS at 12:42

## 2021-04-16 RX ADMIN — FENTANYL CITRATE 50 MCG: 50 INJECTION, SOLUTION INTRAMUSCULAR; INTRAVENOUS at 13:06

## 2021-04-16 RX ADMIN — FENTANYL CITRATE 25 MCG: 50 INJECTION, SOLUTION INTRAMUSCULAR; INTRAVENOUS at 12:41

## 2021-04-16 RX ADMIN — SODIUM CHLORIDE, SODIUM LACTATE, POTASSIUM CHLORIDE, AND CALCIUM CHLORIDE: .6; .31; .03; .02 INJECTION, SOLUTION INTRAVENOUS at 15:26

## 2021-04-16 RX ADMIN — ISOPROTERENOL HYDROCHLORIDE 2 MCG/MIN: 0.2 INJECTION, SOLUTION INTRAMUSCULAR; INTRAVENOUS at 13:23

## 2021-04-16 RX ADMIN — FENTANYL CITRATE 25 MCG: 50 INJECTION, SOLUTION INTRAMUSCULAR; INTRAVENOUS at 12:42

## 2021-04-16 RX ADMIN — MIDAZOLAM HYDROCHLORIDE 2 MG: 2 INJECTION, SOLUTION INTRAMUSCULAR; INTRAVENOUS at 12:41

## 2021-04-16 RX ADMIN — SODIUM CHLORIDE, SODIUM LACTATE, POTASSIUM CHLORIDE, AND CALCIUM CHLORIDE: .6; .31; .03; .02 INJECTION, SOLUTION INTRAVENOUS at 12:31

## 2021-04-16 ASSESSMENT — PULMONARY FUNCTION TESTS
PIF_VALUE: 1
PIF_VALUE: 0
PIF_VALUE: 1
PIF_VALUE: 2
PIF_VALUE: 1
PIF_VALUE: 0
PIF_VALUE: 1
PIF_VALUE: 2
PIF_VALUE: 1

## 2021-04-16 ASSESSMENT — LIFESTYLE VARIABLES: SMOKING_STATUS: 0

## 2021-04-16 NOTE — ANESTHESIA POSTPROCEDURE EVALUATION
Department of Anesthesiology  Postprocedure Note    Patient: Lord Hughes  MRN: [id number]  YOB: 1970  Date of evaluation: 4/16/2021  Time:  5:59 PM     Procedure Summary     Date: 04/16/21 Room / Location: [facility] Cath Lab    Anesthesia Start: 7971 Anesthesia Stop: 0156    Procedure: CATH LAB WITH ANESTHESIA Diagnosis:     Scheduled Providers: Lezlie Krabbe, APRN - CRNA Responsible Provider: Kris Bishop MD    Anesthesia Type: MAC ASA Status: 2          Anesthesia Type: MAC    Bette Phase I:      Bette Phase II:      Last vitals: Reviewed and per EMR flowsheets.        Anesthesia Post Evaluation    Patient location during evaluation: bedside  Patient participation: complete - patient participated  Level of consciousness: awake  Pain score: 2  Airway patency: patent  Nausea & Vomiting: no nausea and no vomiting  Complications: no  Cardiovascular status: hemodynamically stable  Respiratory status: acceptable  Hydration status: euvolemic

## 2021-04-16 NOTE — PRE SEDATION
Sedation Pre-Procedure Note    Patient Name: Lord Hughes   YOB: 1970  Room/Bed: Room/bed info not found  Medical Record Number: [id number]  Date: 4/16/2021   Time: 4:59 PM       Indication: SVT    Consent: I have discussed with the patient and/or the patient representative the indication, alternatives, and the possible risks and/or complications of the planned procedure and the anesthesia methods. The patient and/or patient representative appear to understand and agree to proceed. Vital Signs:   Vitals:    04/16/21 1056   Temp: 99.3 °F (37.4 °C)       Past Medical History:   has a past medical history of Dizziness, GERD (gastroesophageal reflux disease), High blood pressure, Hyperlipidemia, Iron deficiency anemia, unspecified, Malabsorption due to intolerance, not elsewhere classified, Seasonal allergies, and Solar lentiginosis. Past Surgical History:   has a past surgical history that includes Tonsillectomy and adenoidectomy; Appendectomy; and Hysterectomy, vaginal (12/2018). Medications:   Scheduled Meds:    sodium chloride flush  10 mL Intravenous 2 times per day     Continuous Infusions:    sodium chloride       PRN Meds: sodium chloride flush  Home Meds:   Prior to Admission medications    Medication Sig Start Date End Date Taking? Authorizing Provider   Multiple Vitamins-Minerals (THERAPEUTIC MULTIVITAMIN-MINERALS) tablet Take 1 tablet by mouth nightly   Yes Historical Provider, MD   atenolol (TENORMIN) 50 MG tablet TAKE ONE TABLET BY MOUTH DAILY 5/12/20   Jessica Antoine MD   hydroquinone 4 % cream Apply topically 2 times daily.   Patient not taking: Reported on 10/27/2020 9/9/19   Rocio Govea MD   hydrochlorothiazide (HYDRODIURIL) 25 MG tablet Take 0.5 tablets by mouth daily 7/24/19   WILLIAN Mejía CNP   tretinoin (RETIN-A) 0.05 % cream Apply a pea sized amount to the face [facility]  Patient not taking: Reported on 10/27/2020 6/25/19   MD neetu Pink (ANTIVERT) 25 MG tablet Take 25 mg by mouth 3 times daily as needed    Historical Provider, MD   famotidine (PEPCID) 20 MG tablet Take 1 tablet by mouth 2 times daily  Patient taking differently: Take 20 mg by mouth 2 times daily Prn now 11/6/18   Hector Brambila MD   simvastatin (ZOCOR) 10 MG tablet Take 1 tablet by mouth nightly 8/29/17   Vangie Sethi MD     Coumadin Use Last 7 Days:  no  Antiplatelet drug therapy use last 7 days: no  Other anticoagulant use last 7 days: no  Additional Medication Information: None      Pre-Sedation Documentation and Exam:   I have personally completed a history, physical exam & review of systems for this patient (see notes). Vital signs have been reviewed (see flow sheet for vitals).     Mallampati Airway Assessment:  Mallampati Class I - (soft palate, fauces, uvula & anterior/posterior tonsillar pillars are visible)    Prior History of Anesthesia Complications:   none    ASA Classification:  Class 2 - A normal healthy patient with mild systemic disease    Sedation/ Anesthesia Plan:   intravenous sedation    Medications Planned:   fentanyl intravenously and morphine intravenously    Patient is an appropriate candidate for plan of sedation: yes    Electronically signed by Skip Shepherd MD on 4/16/2021 at 4:59 PM

## 2021-04-16 NOTE — ANESTHESIA PRE PROCEDURE
(Patient not taking: Reported on 10/27/2020) 45 g 6    meclizine (ANTIVERT) 25 MG tablet Take 25 mg by mouth 3 times daily as needed      famotidine (PEPCID) 20 MG tablet Take 1 tablet by mouth 2 times daily (Patient taking differently: Take 20 mg by mouth 2 times daily Prn now) 60 tablet 3    simvastatin (ZOCOR) 10 MG tablet Take 1 tablet by mouth nightly 30 tablet 10     Current Facility-Administered Medications   Medication Dose Route Frequency Provider Last Rate Last Admin    0.9 % sodium chloride infusion   Intravenous Continuous Janet Bowman MD        sodium chloride flush 0.9 % injection 10 mL  10 mL Intravenous 2 times per day Janet Bowman MD        sodium chloride flush 0.9 % injection 10 mL  10 mL Intravenous PRN Janet Bowman MD           Allergies:     Allergies   Allergen Reactions    Amoxicillin Hives    Dye [Iodides] Hives     IVP dye    Robitussin Dm [Dextromethorphan-Guaifenesin] Hives       Problem List:    Patient Active Problem List   Diagnosis Code    HTN (hypertension) I10    Bunion, right foot M21.611    Precordial pain R07.2    Hypokalemia E87.6    Microcytic anemia D50.9    GERD (gastroesophageal reflux disease) K21.9    Iron deficiency anemia, unspecified D50.9    Malabsorption due to intolerance, not elsewhere classified K90.49    Solar lentiginosis L81.4    FHx: colon cancer Z80.0    Adenomatous polyp of descending colon D12.4    Milia L72.0       Past Medical History:        Diagnosis Date    Dizziness     GERD (gastroesophageal reflux disease)     High blood pressure     Hyperlipidemia     Iron deficiency anemia, unspecified 11/29/2018    Malabsorption due to intolerance, not elsewhere classified 11/29/2018    Seasonal allergies     Solar lentiginosis 6/25/2019       Past Surgical History:        Procedure Laterality Date    APPENDECTOMY      HYSTERECTOMY, VAGINAL  12/2018    severe bleeding and anemia, fibroids still has ovaries,  Rios Klippel    TONSILLECTOMY AND ADENOIDECTOMY         Social History:    Social History     Tobacco Use    Smoking status: Never Smoker    Smokeless tobacco: Never Used   Substance Use Topics    Alcohol use: Yes     Alcohol/week: 0.0 - 4.0 standard drinks     Comment: socially                                Counseling given: Not Answered      Vital Signs (Current):   Vitals:    04/16/21 1056   Temp: 99.3 °F (37.4 °C)   TempSrc: Oral   Weight: 130 lb (59 kg)   Height: 5' 6\" (1.676 m)                                              BP Readings from Last 3 Encounters:   01/07/21 (!) 143/94   07/23/19 130/86   04/02/19 137/87       NPO Status: Time of last liquid consumption: 2300                        Time of last solid consumption: 2300                                                      BMI:   Wt Readings from Last 3 Encounters:   04/16/21 130 lb (59 kg)   07/23/19 126 lb 12.8 oz (57.5 kg)   04/02/19 132 lb 9.6 oz (60.1 kg)     Body mass index is 20.98 kg/m². CBC:   Lab Results   Component Value Date    WBC 9.6 01/07/2021    RBC 3.92 01/07/2021    HGB 12.6 01/07/2021    HCT 35.8 01/07/2021    MCV 91.3 01/07/2021    RDW 13.0 01/07/2021     01/07/2021       CMP:   Lab Results   Component Value Date     01/07/2021    K 3.8 01/07/2021     01/07/2021    CO2 25 01/07/2021    BUN 15 01/07/2021    CREATININE 0.8 01/07/2021    GFRAA >60 01/07/2021    GFRAA >60 07/03/2012    AGRATIO 1.9 01/07/2021    LABGLOM >60 01/07/2021    GLUCOSE 128 01/07/2021    PROT 7.0 01/07/2021    PROT 7.2 07/03/2012    CALCIUM 9.3 01/07/2021    BILITOT 0.3 01/07/2021    ALKPHOS 92 01/07/2021    AST 23 01/07/2021    ALT 14 01/07/2021       POC Tests: No results for input(s): POCGLU, POCNA, POCK, POCCL, POCBUN, POCHEMO, POCHCT in the last 72 hours.     Coags: No results found for: PROTIME, INR, APTT    HCG (If Applicable): No results found for: PREGTESTUR, PREGSERUM, HCG, HCGQUANT     ABGs: No results found for: PHART, PO2ART, NOI9OZS, OCA9LCH, BEART, K8TYTRKI     Type & Screen (If Applicable):  No results found for: LABABO, LABRH    Drug/Infectious Status (If Applicable):  No results found for: HIV, HEPCAB    COVID-19 Screening (If Applicable):   Lab Results   Component Value Date    COVID19 Not Detected 04/12/2021           Anesthesia Evaluation  Patient summary reviewed and Nursing notes reviewed no history of anesthetic complications:   Airway: Mallampati: I  TM distance: >3 FB   Neck ROM: full  Mouth opening: > = 3 FB Dental: normal exam         Pulmonary: breath sounds clear to auscultation      (-) not a current smoker (NEVER )                           Cardiovascular:  Exercise tolerance: good (>4 METS),   (+) hypertension: moderate, dysrhythmias (SVT):,     (-) past MI    NYHA Classification: I  ECG reviewed  Rhythm: regular  Rate: normal           Beta Blocker:  Dose within 24 Hrs         Neuro/Psych:   Negative Neuro/Psych ROS              GI/Hepatic/Renal:   (+) GERD: well controlled,           Endo/Other:                     Abdominal:           Vascular:                                        Anesthesia Plan      MAC     ASA 2       Induction: intravenous. MIPS: Prophylactic antiemetics administered. Anesthetic plan and risks discussed with patient. Plan discussed with CRNA.     Attending anesthesiologist reviewed and agrees with Pre Eval content              Tera Mobley DO   4/16/2021

## 2021-04-16 NOTE — PROCEDURES
[address]     Electrophysiology Procedure Note       Date of Procedure: 4/16/2021  Patient's Name: Precious Ordonez  YOB: 1970   Medical Record Number: [id number]  Referring Physician: Olesya att. providers found  Procedure Performed by: Santosh Mejía MD    Procedure performed:    · Comprehensive electrophysiological study with attempted induction of arrhythmia at baseline. · Attempted induction of arrhythmia after IV drug infusion. · Three-dimensional electroanatomic mapping of the right atrium  · Left atrial recording and mapping via coronary sinus   · Radiofrequency ablation of SVT, AV mike re-entry. · Radiofrequency ablation of focal atrial tachycardia, along the superomedial/septal part of the tricuspid annulus, close towards the noncoronary cusp  · Anesthesia: Local and Monitored Anesthesia Care  · Mallampati airway assessment class: I  · ASA class: 1  · An independent trained observer pushed medications at my direction. We monitored the patient's level of consciousness and vital signs/physiologic status throughout the procedure duration  · EBL <20 cc    Indications for procedure:     Precious Ordonez 53 y.o. female  with PMH of documented symptomatic SVT. Patient has had palpitation dyspnea, chest pain associated with documented SVT by EKG. Details of Procedure: The risks, benefits and alternatives of the ablation procedure were discussed with the patient. The risks including, but not limited to, the risks of bleeding, infection, radiation exposure, injury to vascular, cardiac and surrounding structures (including pneumothorax), stroke, cardiac perforation, tamponade, need for emergent open heart surgery, need for pacemaker implantation, myocardial infarction and death were discussed in detail. The patient opted to proceed with the ablation. Written informed consent was signed and placed in the chart.   The patient was brought to the electrophysiology lab in a fasting nonsedated state. Pre-sedation evaluation and airway assessment was completed. IV sedation was provided with IV Versed, Fentanyl. The patient was monitored continuously with ECG, pulse oximetry, blood pressure monitoring, and direct observation. Both groins were prepped and draped in the usual sterile fashion. After injection of 2% lidocaine in the right groin, one 8.5F SRO sheath, one 8F short sheath, and one 6F short sheath were introduced to the right femoral vein. Through the SRO sheath, we advanced Smart-Touch-Surround Flow catheter to the right atrium and then to enter the coronary sinus without fluoroscopy but utilizing the 3-D electroanatomic map. Fast anatomical mapping of the coronary sinus and right atrium was performed. Then, we advanced Biosense dyson CS catheter to the coronary sinus for left atrial pacing and CRD 2 catheter to the bundle of His region and into the right ventricle. Biosense Dyson SmartTouch-Surround Flow catheter was placed in the high right atrial position. After that, we did baseline measurements by pacing in atria, left atrium via coronary sinus and right ventricle and performed programmed stimulation. . Sinus cycle length was 784 msec  . AR interval: 142 msec  . QRS duration: 91 msec  . QT interval: 438 msec  . A-H interval of 71 msec  . H-V interval of 43 msec  . 1:1 antegrade conduction over AV block (AV mike wenckebach cycle length) was 320 msec   . AV crossover at 350 ms  . AV node /320  . To Cumber 1:1 retrograde conduction over AV node (VA wenckebach cycle lenght) was 440 msec  . VA ERP of 600/380 msec     Arrhythmia Induction:  Patient was started on Isuprel up to 2 mcg/min. A supraventricular tachydysrhythmia was induced with programmed stimulation.     SVT:     Initiation: with critical AH lengthening    Cycle length: 340 ms    VA time: 84 ms    Earliest Activation: Proximal His electrodes    Response to His-synchronous VPD: Did not alter activation and did not advance the A    Termination: With an A    Response to Para-Hisian Pacing: No septal accessory pathway    Mechanism: Typical AVNRT    Mapping and Ablation: Then three-dimensional mapping system (Carto navigation system)  was used and a 3D electroanatomical map of the right atrium including His bundle and CS location was created. The ablation catheter was advanced into position along the septal aspect of the tricuspid valve under 3D mapping guidance. Electrophysiologic mapping was performed to localize an area on the anterior lip of the coronary sinus ostium. Voltage map of the Salcedo's triangle was also performed. We identified an area where an atrial-ventricular ratio of more than 1:10, along with slow pathway potential. Following identification of this area, radiofrequency lesions were delivered with demonstration of stable junctional rhythm with maintenance of retrograde conduction. This resulted in successful ablation of slow pathway which was later confirmed by lack of AH jump. We also tried to induced the tachycardia by atrial and ventricular extra-stimuli which showed the disappearance of AH jump and no re-entrant tachycardia was induced. Following ablation, and appropriate waiting time, programmed stimulation was performed off and on Isuprel (up to 2 mcg/min). There was no presence of the slow pathway as manifested by the absence of an AH jump. Interestingly, with Isuprel she was easily induced into a different form of SVT with a tachycardia cycle length of 386 ms. This was repeatedly inducible at 600/300/390 from proximal coronary sinus. This is a regular, narrow QRS complex, mid to long RP tachycardia. During tachycardia, there is no VA linking noted. In addition to this, during tachycardia, there were intermittent missed VA beats and tachycardia continued. Activation was early in the proximal coronary sinus and concentric. Ventricular entrainment was attempted.   Clearly, with a ventricular pacing and ventricular capture, we were not able to pull in the atrial signals and atrial tachycardia continued. Hence, ablation catheter was moved along in the right atrium to do activation mapping. This tachycardia was repeatedly terminated when ablation catheter was close towards the septal part of the tricuspid annulus. Based on the P wave morphology, this was consistent with atrial tachycardia coming along the septal part of the tricuspid annulus /coronary sinus os /noncoronary cusp. Hence, this region was carefully mapped. An 5 Polish intracardiac echocardiogram catheter was advanced into the right atrium. Ultrasound map of the right atrium, tricuspid annulus and coronary sinus was performed. Similarly, cortisone map of the long axis and short axis of the aorta was also performed. We were able to find early signals along the superolateral part of the tricuspid annulus. In fact, the earliest signal was at the level of the his activation. We found another area which is about the his region with at least 23 ms earliest activation. Ablation was performed over here  Based on the intracardiac echocardiogram catheter, this was very close towards the noncoronary cusp. This resulted in termination of tachycardia. However, this was very close towards the noncoronary cusp. This would be very ideal to place the catheter in the noncoronary cusp and ablate from that site. However, by this time, I have given heparin secondary to right-sided access. I am little bit reluctant to get the arterial access with a 8 Polish sheath while she is already anticoagulated. Moreover, as it is very proximal to the His bundle region, I would like to have a longer discussion about the risk and benefits before proceeding to ablating the parahisian atrial tachycardia. Following ablation, programmed stimulation was performed:    . Sinus cycle length was 824 msec  . [place] interval: 166 msec  .  QRS duration: 92 msec  . QT interval: 410 msec  . A-H interval of 70 msec  . H-V interval of 39 msec  . 1:1 antegrade conduction over AV block (AV mike wenckebach cycle length) was 350 msec   . There was no evidence of dual AV node physiology   . AVNERP of 600/320 msec   . 1:1 retrograde conduction over AV node (VA wenckebach cycle lenght) was 400 msec    There was no presence of the slow pathway nor an AH jump. Catheters and sheaths were removed. Sheaths were removed and hemostasis achieved with Vascade MVP closure device. The patient tolerated the procedure well and there were no complications. Post-sedation evaluation was completed. Patient was transported to the holding area in stable condition. Conclusion:  Successful ablation of AV mike re-entrant tachycardia. Ablation of parahisian atrial tachycardia -from the right atrium  Ideally, I would like to attempt from the noncoronary cusp. However, secondary to anticoagulation for the right-sided procedure, I decided not to go for retrograde access. PLAN:    Bed rest for two hours  From an EP perspective, if no further issues, the patient may be discharged home today after 2 hour bedrest and if the patient remains stable. Patient will receive usual post ablation care. Follow-up with Dr. Bobby Gil in one months' time. Thank you for allowing me to participate in the care of your patient. If you have any questions, please do not hesitate to contact me.     Evgeny Corbett MD   Cardiac Electrophysiology  [address] [place] [address] [phone]  [facility]

## 2021-04-16 NOTE — H&P
[address]   Cardiac Electrophysiology H&P  Date: 4/16/2021  Admit Date:  4/16/2021  Reason for admission: SVT  Consult Requesting Physician: No att. providers found     No chief complaint on file. HPI: Lord Hughes is a 53 y.o. with past medical history significant for hypertension, hyperlipidemia, GERD who recently had an episode of highly symptomatic SVT, with palpitations and lightheadedness, was noted to have SVT with rate of 220 bpm.  It was documented in the EMS rhythm strip. She received adenosine 6 mg IV and got terminated to normal sinus rhythm. By the time she reached ER, she was back to her baseline. Review of the rhythm strip showed a regular, narrow QRS complex, short to mid RP tachycardia. Past Medical History:   Diagnosis Date    Dizziness     GERD (gastroesophageal reflux disease)     High blood pressure     Hyperlipidemia     Iron deficiency anemia, unspecified 11/29/2018    Malabsorption due to intolerance, not elsewhere classified 11/29/2018    Seasonal allergies     Solar lentiginosis 6/25/2019        Past Surgical History:   Procedure Laterality Date    APPENDECTOMY      HYSTERECTOMY, VAGINAL  12/2018    severe bleeding and anemia, fibroids still has ovaries,  Aleksandr Karram    TONSILLECTOMY AND ADENOIDECTOMY         Allergies   Allergen Reactions    Amoxicillin Hives    Dye [Iodides] Hives     IVP dye    Robitussin Dm [Dextromethorphan-Guaifenesin] Hives       Social History:  Reviewed. reports that she has never smoked. She has never used smokeless tobacco. She reports current alcohol use. She reports that she does not use drugs. Family History:  Reviewed. family history includes Cancer in her father; Colon Cancer (age of onset: 58) in her father; Coronary Art Dis (age of onset: 61) in her father; High Cholesterol in her father and mother; Hypertension in her mother; Stroke (age of onset: 76) in her mother. No premature CAD.      Review of System:  All other systems reviewed except for that noted above. Pertinent negatives and positives are:     Objective      · General: negative for fever, chills   · Ophthalmic ROS: negative for - eye pain or loss of vision  · ENT ROS: negative for - headaches, sore throat   · Respiratory: negative for - cough, sputum  · Cardiovascular: Reviewed in HPI  · Gastrointestinal: negative for - abdominal pain, diarrhea, N/V  · Hematology: negative for - bleeding, blood clots, bruising or jaundice  · Genito-Urinary:  negative for - Dysuria or incontinence  · Musculoskeletal: negative for - Joint swelling, muscle pain  · Neurological: negative for - confusion, dizziness, headaches   · Psychiatric: No anxiety, no depression. · Dermatological: negative for - rash    Physical Examination:  Vitals:    21 1056   Temp: 99.3 °F (37.4 °C)        Intake/Output Summary (Last 24 hours) at 2021 1656  Last data filed at 2021 1526  Gross per 24 hour   Intake 1000 ml   Output --   Net 1000 ml     In: 1000 [I.V.:1000]  Out: -    Wt Readings from Last 3 Encounters:   21 130 lb (59 kg)   19 126 lb 12.8 oz (57.5 kg)   19 132 lb 9.6 oz (60.1 kg)     Temp  Av.3 °F (37.4 °C)  Min: 99.3 °F (37.4 °C)  Max: 99.3 °F (37.4 °C)  BP  Min: 103/61  Max: 192/109  SpO2  Av.9 %  Min: 69 %  Max: 100 %  FiO2   Av.2 %  Min: 21 %  Max: 79 %    · Telemetry: Sinus rhythm   · Constitutional: Alert. Oriented to person, place, and time. No distress. · Head: Normocephalic and atraumatic. · Mouth/Throat: Lips appear moist. Oropharynx is clear and moist.  · Eyes: Conjunctivae normal. EOM are normal.   · Neck: Neck supple. No lymphadenopathy. No rigidity. No JVD present. · Cardiovascular: Normal rate, regular rhythm. Normal S1&S2. Carotid pulse 2+ bilaterally. · Pulmonary/Chest: Bilateral respiratory sounds present. No respiratory accessory muscle use. No wheezes, No rhonchi. · Abdominal: Soft.  Normal bowel sounds present. No distension, No tenderness. No splenomegaly. No hernia. · Musculoskeletal: No tenderness. No edema    · Lymphadenopathy: Has no cervical adenopathy. · Neurological: Alert and oriented. Cranial nerve II-XII grossly intact, No gross deficit to touch. · Skin: Skin is warm and dry. No rash, lesions, ulcerations noted. · Psychiatric: No anxiety nor agitation. Labs:  Reviewed. Recent Labs     04/16/21  1110      K 3.6      CO2 24   BUN 11   CREATININE 0.9     Recent Labs     04/16/21  1110   WBC 5.0   HGB 13.8   HCT 39.8   MCV 92.7        Lab Results   Component Value Date    CKTOTAL 53 10/20/2017    TROPONINI <0.01 01/07/2021     No results found for: BNP  Lab Results   Component Value Date    PROTIME 12.1 04/16/2021    INR 1.04 04/16/2021     Lab Results   Component Value Date    CHOL 187 11/10/2020    HDL 38 11/10/2020    TRIG 121 11/10/2020       Diagnostic and imaging results reviewed. I independently reviewed the ECG and telemetry. Scheduled Meds:   sodium chloride flush  10 mL Intravenous 2 times per day     Continuous Infusions:   sodium chloride       PRN Meds:.sodium chloride flush     Assessment:   Patient Active Problem List    Diagnosis Date Noted    Milia 10/02/2019    FHx: colon cancer 07/23/2019    Adenomatous polyp of descending colon 07/23/2019    Solar lentiginosis 06/25/2019    Iron deficiency anemia, unspecified 11/29/2018    Malabsorption due to intolerance, not elsewhere classified 11/29/2018    Microcytic anemia 11/06/2018    GERD (gastroesophageal reflux disease) 11/06/2018    Precordial pain 11/05/2018    Hypokalemia 11/05/2018    Bunion, right foot 10/11/2014    HTN (hypertension)       There are no active hospital problems to display for this patient. Recommendation (s):    We educated the patient that this PSVT is commonly found to be a progressive disease, with more frequent episodes that will ensue.   Subsequent episodes usually become more sustained, as the patient is already experiencing. We discussed different management options for PSVT including their risks and benefits. These options include use of AV mike blocking agents such as beta blockers and calcium channel blockers. Although these medications may be immediately, and for the short term, effective, our clinical experience is that the tachydysrhythmia will eventually recur even in the midst of these medications. Of course, adenosine intravenously would still be an option for emergency cases, but that is impractical on a day-to-day basis. Anti-arrhythmic medications also provide a very effective therapy. However, for both AV mike blocking agents and anti-arrhythmic medications, the realistic and probable side effects of these medications make it difficult for younger, more active, individuals to bear - fatigue, dyspnea with exertion. Finally, EP study with PSVT ablation is a curative therapy with a very high success rate after a first time procedure and would afford the patient the complete eradication of the PSVT and no longer the need for any medications. The risks and benefits of an EP study +/- PSVT ablation were discussed at length, with the risks including, but not limited to, bleeding, infection, radiation exposure, injury to vascular, cardiac and surrounding structures (including pneumothorax), stroke, cardiac perforation, tamponade, need for emergent open heart surgery, need for pacemaker implantation, injury to the phrenic nerve, injury to the esophagus, myocardial infarction and death. Thank you for allowing me to participate in the care of Austen Perez . If you have any questions/comments, please do not hesitate to contact us. Dejon Oliveira MD   Cardiac Electrophysiology  [address]    For any EP related issues after 5 PM, contact [facility] on call cardiology through .

## 2021-04-26 DIAGNOSIS — R31.9 HEMATURIA, UNSPECIFIED TYPE: Primary | ICD-10-CM

## 2021-04-26 LAB
BACTERIA: ABNORMAL /HPF
BILIRUBIN URINE: NEGATIVE
BLOOD, URINE: ABNORMAL
CLARITY: CLEAR
COLOR: YELLOW
COMMENT UA: ABNORMAL
EPITHELIAL CELLS, UA: 2 /HPF (ref 0–5)
GLUCOSE URINE: NEGATIVE MG/DL
HYALINE CASTS: 2 /LPF (ref 0–8)
KETONES, URINE: NEGATIVE MG/DL
LEUKOCYTE ESTERASE, URINE: ABNORMAL
MICROSCOPIC EXAMINATION: YES
NITRITE, URINE: NEGATIVE
PH UA: 7 (ref 5–8)
PROTEIN UA: NEGATIVE MG/DL
RBC UA: 2 /HPF (ref 0–4)
SPECIFIC GRAVITY UA: 1 (ref 1–1.03)
URINE REFLEX TO CULTURE: YES
URINE TYPE: ABNORMAL
UROBILINOGEN, URINE: 0.2 E.U./DL
WBC UA: 15 /HPF (ref 0–5)

## 2021-04-27 RX ORDER — CIPROFLOXACIN 500 MG/1
500 TABLET, FILM COATED ORAL DAILY
Qty: 3 TABLET | Refills: 0 | Status: SHIPPED | OUTPATIENT
Start: 2021-04-27 | End: 2021-04-30

## 2021-04-28 LAB — URINE CULTURE, ROUTINE: NORMAL

## 2021-05-07 RX ORDER — ATENOLOL 50 MG/1
TABLET ORAL
Qty: 90 TABLET | Refills: 3 | Status: SHIPPED | OUTPATIENT
Start: 2021-05-07 | End: 2021-08-12 | Stop reason: SDUPTHER

## 2021-05-14 NOTE — PROGRESS NOTES
[address]   Electrophysiology  Office Visit  Date: 5/20/2021    Chief Complaint   Patient presents with    Tachycardia    Hypertension       Cardiac HX: Colby Sanders is a 53 y.o. 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 Vipul Carty). Interval History/HPI: Patient is here for follow-up for SVT and HTN. Patient has a history of palpitations however no history of SVT. She had received her first Covid vaccination on 1/6/2021 and approximately 8 hours after receiving her dose she was sitting on the couch watching television when she felt her heart start to race, felt palpitations, lightheadedness along with chest pain and shortness of breath. She attempted Valsalva maneuvers with no change. After about 5 minutes she ended up calling the [facility]. She was noted to be in an SVT with a heart rate up to 220 bpm.  She was given 6 mg of adenosine in the life squad that converted her to normal sinus rhythm. She did undergo catheter ablation for AVNRT and  AT on 4/16/2021. Her right groin has healed well. She states that she had palpitations after the procedure however this past week she has not felt any. Her blood pressure is elevated in the office today. She states her diastolic blood pressure normally runs in the 80s. She has been on atenolol 50 mg for approximately 12 years for her blood pressure. She denies chest pain, shortness of breath, PND, orthopnea or lower extremity edema.     Home medications:   Current Outpatient Medications on File Prior to Visit   Medication Sig Dispense Refill    atenolol (TENORMIN) 50 MG tablet TAKE ONE TABLET BY MOUTH DAILY 90 tablet 3    meclizine (ANTIVERT) 25 MG tablet Take 25 mg by mouth 3 times daily as needed      famotidine (PEPCID) 20 MG tablet Take 1 tablet by mouth 2 times daily (Patient taking differently: Take 20 mg by mouth 2 times daily Prn now) 60 tablet 3    Multiple Vitamins-Minerals (THERAPEUTIC MULTIVITAMIN-MINERALS) tablet Take 1 tablet by mouth nightly      simvastatin (ZOCOR) 10 MG tablet Take 1 tablet by mouth nightly (Patient not taking: Reported on 5/20/2021) 30 tablet 10     No current facility-administered medications on file prior to visit. Past Medical History:   Diagnosis Date    Dizziness     GERD (gastroesophageal reflux disease)     High blood pressure     Hyperlipidemia     Iron deficiency anemia, unspecified 11/29/2018    Malabsorption due to intolerance, not elsewhere classified 11/29/2018    Seasonal allergies     Solar lentiginosis 6/25/2019        Past Surgical History:   Procedure Laterality Date    APPENDECTOMY      HYSTERECTOMY, VAGINAL  12/2018    severe bleeding and anemia, fibroids still has ovaries,  Aleksandr Karram    TONSILLECTOMY AND ADENOIDECTOMY         Allergies   Allergen Reactions    Amoxicillin Hives    Dye [Iodides] Hives     IVP dye    Robitussin Dm [Dextromethorphan-Guaifenesin] Hives       Social History:  Reviewed. reports that she has never smoked. She has never used smokeless tobacco. She reports current alcohol use. She reports that she does not use drugs. Family History:  Reviewed. family history includes Cancer in her father; Colon Cancer (age of onset: 58) in her father; Coronary Art Dis (age of onset: 61) in her father; High Cholesterol in her father and mother; Hypertension in her mother; Stroke (age of onset: 76) in her mother. Review of System:    · Constitutional: No fevers, chills. · Eyes: No visual changes or diplopia. No scleral icterus. · ENT: No Headaches. No mouth sores or sore throat. · Cardiovascular: No for chest pain, No for dyspnea on exertion, yes for palpitations or No for loss of consciousness. No cough, hemoptysis, No for pleuritic pain, or phlebitis. · Respiratory: No for cough or wheezing. No hematemesis. · Gastrointestinal: No abdominal pain, blood in stools.    · Genitourinary: No dysuria, or hematuria. · Musculoskeletal: No gait disturbance,    · Integumentary: No rash or pruritis. · Neurological: No headache, change in muscle strength, numbness or tingling. · Psychiatric: No anxiety, or depression. · Endocrine: No temperature intolerance. No excessive thirst, fluid intake, or urination. · Hem/Lymph: No abnormal bruising or bleeding, blood clots or swollen lymph nodes. · Allergic/Immunologic: No nasal congestion or hives. Physical Examination:  Vitals:    05/20/21 1318   BP: 130/86   Pulse: Wt Readings from Last 3 Encounters:   05/20/21 133 lb 3.2 oz (60.4 kg)   04/16/21 130 lb (59 kg)   07/23/19 126 lb 12.8 oz (57.5 kg)       · Constitutional: Oriented. No distress. · Head: Normocephalic and atraumatic. · Mouth/Throat: Oropharynx is clear and moist.   · Eyes: Conjunctivae clear without jaunduice. PERRL. · Neck: Neck supple. No rigidity. No JVD present. · Cardiovascular: Normal rate, regular rhythm, S1&S2. · Pulmonary/Chest: Bilateral respiratory sounds. No wheezes, No rhonchi. · Abdominal: Soft. Bowel sounds present. No distension, No tenderness. · Musculoskeletal: No tenderness. No edema    · Lymphadenopathy: Has no cervical adenopathy. · Neurological: Alert and oriented. Cranial nerve appears intact, No Gross deficit   · Skin: Skin is warm and dry. No rash noted. · Psychiatric: Has a normal mood, affect and behavior     Labs:  Reviewed. No results for input(s): NA, K, CL, CO2, PHOS, BUN, CREATININE in the last 72 hours. Invalid input(s): CA,  TSH  No results for input(s): WBC, HGB, HCT, MCV, PLT in the last 72 hours.   Lab Results   Component Value Date    CKTOTAL 53 10/20/2017    TROPONINI <0.01 01/07/2021     No results found for: BNP  Lab Results   Component Value Date    PROTIME 12.1 04/16/2021    INR 1.04 04/16/2021     Lab Results   Component Value Date    CHOL 187 11/10/2020    HDL 38 11/10/2020    TRIG 121 11/10/2020       ECG: Personally reviewed: SB, HR 55, , QRS 90, QTc 420    ECHO:  N/A    Stress Test: 11.6.2018  Summary    Normal myocardial perfusion study.        Normal LV size and systolic function.        Non-diagnostic EKG response due to failure to reach target heart rate.    Overall findings represent a low risk study. Cardiac Angiography: N/A    Problem List:   Patient Active Problem List    Diagnosis Date Noted    [facility]ra[facility]ricular tachycardia[facility]     Milia 10/02/2019    FHx: colon cancer 07/23/2019    Adenomatous polyp of descending colon 07/23/2019    Solar lentiginosis 06/25/2019    Iron deficiency anemia, unspecified 11/29/2018    Malabsorption due to intolerance, not elsewhere classified 11/29/2018    Microcytic anemia 11/06/2018    GERD (gastroesophageal reflux disease) 11/06/2018    Precordial pain 11/05/2018    Hypokalemia 11/05/2018    Bunion, right foot 10/11/2014    HTN (hypertension)         Assessment:   1. SVT (supraventricular tachycardia) ([facility]    2. Benign essential HTN      Cardiac HX: Sherren Holmes is a 53 y.o. 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 Sherif Laguna). SVT  - In SB - no breakthrough per patient, occasional palpitations  - On atenolol 50 mg daily    HTN  - BP elevated in the office today  - On atenolol 50 mg QD  - Will add lisinopril 5 mg  - BMP 1 week  - Echo  - F/u 1 year  - ECG ordered and results personally reviewed     No known systolic HF  No known CAD  No known AF  No Tobacco use. All questions and concerns were addressed to the patient/family. Alternatives to my treatment were discussed. The note was completed using EMR. Every effort was made to ensure accuracy; however, inadvertent computerized transcription errors may be present. Patient received education regarding their diagnosis, treatment and medications while in the office today.       Gavin Starks [address]

## 2021-05-20 ENCOUNTER — OFFICE VISIT (OUTPATIENT)
Dept: CARDIOLOGY CLINIC | Age: 51
End: 2021-05-20
Payer: COMMERCIAL

## 2021-05-20 VITALS
BODY MASS INDEX: 21.41 KG/M2 | WEIGHT: 133.2 LBS | HEART RATE: 55 BPM | HEIGHT: 66 IN | SYSTOLIC BLOOD PRESSURE: 130 MMHG | DIASTOLIC BLOOD PRESSURE: 86 MMHG

## 2021-05-20 DIAGNOSIS — I47.1 SVT (SUPRAVENTRICULAR TACHYCARDIA) (HCC): Primary | ICD-10-CM

## 2021-05-20 DIAGNOSIS — I10 BENIGN ESSENTIAL HTN: ICD-10-CM

## 2021-05-20 PROCEDURE — G8420 CALC BMI NORM PARAMETERS: HCPCS | Performed by: NURSE PRACTITIONER

## 2021-05-20 PROCEDURE — 93000 ELECTROCARDIOGRAM COMPLETE: CPT | Performed by: NURSE PRACTITIONER

## 2021-05-20 PROCEDURE — G8427 DOCREV CUR MEDS BY ELIG CLIN: HCPCS | Performed by: NURSE PRACTITIONER

## 2021-05-20 PROCEDURE — 99214 OFFICE O/P EST MOD 30 MIN: CPT | Performed by: NURSE PRACTITIONER

## 2021-05-20 PROCEDURE — 1036F TOBACCO NON-USER: CPT | Performed by: NURSE PRACTITIONER

## 2021-05-20 RX ORDER — LISINOPRIL 5 MG/1
5 TABLET ORAL DAILY
Qty: 30 TABLET | Refills: 5 | Status: SHIPPED | OUTPATIENT
Start: 2021-05-20 | End: 2021-08-02 | Stop reason: SDUPTHER

## 2021-05-24 ENCOUNTER — PROCEDURE VISIT (OUTPATIENT)
Dept: CARDIOLOGY CLINIC | Age: 51
End: 2021-05-24
Payer: COMMERCIAL

## 2021-05-24 DIAGNOSIS — I10 BENIGN ESSENTIAL HTN: ICD-10-CM

## 2021-05-24 LAB
LV EF: 63 %
LVEF MODALITY: NORMAL

## 2021-05-24 PROCEDURE — 93306 TTE W/DOPPLER COMPLETE: CPT | Performed by: INTERNAL MEDICINE

## 2021-08-02 RX ORDER — LISINOPRIL 5 MG/1
5 TABLET ORAL DAILY
Qty: 90 TABLET | Refills: 3 | Status: SHIPPED | OUTPATIENT
Start: 2021-08-02 | End: 2021-10-21 | Stop reason: DRUGHIGH

## 2021-08-02 NOTE — TELEPHONE ENCOUNTER
Requested Prescriptions     Pending Prescriptions Disp Refills    lisinopril (PRINIVIL;ZESTRIL) 5 MG tablet 30 tablet 5     Sig: Take 1 tablet by mouth daily          Number: 90    Refills: 3    Last Office Visit: 5/20/2021     Next Office Visit: 5.23.2022    Last Labs: 4.26.2021

## 2021-08-12 RX ORDER — ATENOLOL 50 MG/1
TABLET ORAL
Qty: 90 TABLET | Refills: 3 | Status: SHIPPED | OUTPATIENT
Start: 2021-08-12 | End: 2021-11-09 | Stop reason: SDUPTHER

## 2021-09-30 ENCOUNTER — TELEPHONE (OUTPATIENT)
Dept: ORTHOPEDIC SURGERY | Age: 51
End: 2021-09-30

## 2021-09-30 NOTE — TELEPHONE ENCOUNTER
Dear PCP Provider: [facility]) has partnered with [facility] to utilize predictive analytics to improve the care management for patients with arthritic knee pain. Utilizing claims data and patient visit features, we have developed an algorithmic risk score to determine which patient's quality of life may be most impacted to their knee pain. The selection criteria for this  program are: 1) risk score greater than .85; 2) existing [address] [place] [address] Floor patient; and 3) seen for knee pain in the past 3 years. Based upon the predictive analytics risk score  program, your patient has a risk score of greater than . 85 (i.e. 85% probability in having their quality of life impacted by their knee pain). To assist with care management of your patient, a navigator will be contacting them to understand their knee pain status and to educate on the [address] Pain Program, including scheduling an appointment with a joint specialist or their PCP. If you feel this patient not appropriate to be contacted given other medical reasons, please reply back to the navigator within 7 days with reason why not to be contacted. Otherwise, the navigator will follow up with you on the details of the patient encounter after the call.  Thank you for your support for this program.

## 2021-10-05 ENCOUNTER — TELEPHONE (OUTPATIENT)
Dept: ORTHOPEDIC SURGERY | Age: 51
End: 2021-10-05

## 2021-10-05 NOTE — TELEPHONE ENCOUNTER
LVM for patient regarding the [address] [place] [address] Orthopedic joint pain program. Patient can call [phone] for more information or to schedule an appointment with a joint pain specialist.

## 2021-10-21 RX ORDER — LISINOPRIL 10 MG/1
10 TABLET ORAL DAILY
Qty: 90 TABLET | Refills: 3 | Status: SHIPPED | OUTPATIENT
Start: 2021-10-21 | End: 2021-11-09 | Stop reason: SDUPTHER

## 2021-10-21 NOTE — PROGRESS NOTES
Patient c/o palpitations. States she had an episode the other night where she felt her heart racing, thought it was not in a stop and then it did. She thought that perhaps it lasted for about 20 beats. She had worn a monitor for a week that showed some short runs of atrial tach. We will monitor for now. If she starts to have more episodes and we will have an extended monitor placed. Her blood pressures have not been well controlled. Her diastolic runs in the 64D. We will increase her lisinopril from 5 mg to 10 mg daily for better blood pressure control. She is due to have labs for her primary care next week. She has not been taking the Zocor due to muscle aches. I would recommend Crestor versus Zocor if her LDL comes back elevated. With the patient.

## 2021-10-27 ENCOUNTER — TELEPHONE (OUTPATIENT)
Dept: CARDIOLOGY CLINIC | Age: 51
End: 2021-10-27

## 2021-10-27 ENCOUNTER — HOSPITAL ENCOUNTER (OUTPATIENT)
Age: 51
Discharge: HOME | End: 2021-10-27
Payer: COMMERCIAL

## 2021-10-27 ENCOUNTER — HOSPITAL ENCOUNTER (OUTPATIENT)
Dept: GENERAL RADIOLOGY | Age: 51
Discharge: HOME | End: 2021-10-27
Payer: COMMERCIAL

## 2021-10-27 DIAGNOSIS — R05.9 COUGH: Primary | ICD-10-CM

## 2021-10-27 DIAGNOSIS — R05.9 COUGH: ICD-10-CM

## 2021-10-27 PROCEDURE — 71046 X-RAY EXAM CHEST 2 VIEWS: CPT

## 2021-10-27 NOTE — TELEPHONE ENCOUNTER
Patient with persistent cough after choking on water - wondering if she aspirated. Will order a CXR.

## 2021-11-09 RX ORDER — ATENOLOL 50 MG/1
TABLET ORAL
Qty: 90 TABLET | Refills: 3 | Status: SHIPPED | OUTPATIENT
Start: 2021-11-09 | End: 2022-02-08 | Stop reason: SDUPTHER

## 2021-11-09 RX ORDER — LISINOPRIL 10 MG/1
10 TABLET ORAL DAILY
Qty: 90 TABLET | Refills: 3 | Status: SHIPPED | OUTPATIENT
Start: 2021-11-09 | End: 2022-02-08 | Stop reason: SDUPTHER

## 2021-11-29 DIAGNOSIS — Z00.00 ANNUAL PHYSICAL EXAM: ICD-10-CM

## 2021-11-29 LAB
A/G RATIO: 2 (ref 1.1–2.2)
ALBUMIN SERPL-MCNC: 5 G/DL (ref 3.4–5)
ALP BLD-CCNC: 63 U/L (ref 40–129)
ALT SERPL-CCNC: 7 U/L (ref 10–40)
ANION GAP SERPL CALCULATED.3IONS-SCNC: 14 MMOL/L (ref 3–16)
AST SERPL-CCNC: 16 U/L (ref 15–37)
BASOPHILS ABSOLUTE: 0.1 K/UL (ref 0–0.2)
BASOPHILS RELATIVE PERCENT: 0.9 %
BILIRUB SERPL-MCNC: 0.5 MG/DL (ref 0–1)
BUN BLDV-MCNC: 13 MG/DL (ref 7–20)
CALCIUM SERPL-MCNC: 9.6 MG/DL (ref 8.3–10.6)
CHLORIDE BLD-SCNC: 102 MMOL/L (ref 99–110)
CHOLESTEROL, TOTAL: 187 MG/DL (ref 0–199)
CO2: 24 MMOL/L (ref 21–32)
CREAT SERPL-MCNC: 0.8 MG/DL (ref 0.6–1.1)
EOSINOPHILS ABSOLUTE: 0.3 K/UL (ref 0–0.6)
EOSINOPHILS RELATIVE PERCENT: 4.1 %
GFR AFRICAN AMERICAN: >60
GFR NON-AFRICAN AMERICAN: >60
GLUCOSE BLD-MCNC: 100 MG/DL (ref 70–99)
HCT VFR BLD CALC: 39 % (ref 36–48)
HDLC SERPL-MCNC: 46 MG/DL (ref 40–60)
HEMOGLOBIN: 13.1 G/DL (ref 12–16)
LDL CHOLESTEROL CALCULATED: 108 MG/DL
LYMPHOCYTES ABSOLUTE: 1.8 K/UL (ref 1–5.1)
LYMPHOCYTES RELATIVE PERCENT: 29.2 %
MCH RBC QN AUTO: 32 PG (ref 26–34)
MCHC RBC AUTO-ENTMCNC: 33.7 G/DL (ref 31–36)
MCV RBC AUTO: 95.1 FL (ref 80–100)
MONOCYTES ABSOLUTE: 0.3 K/UL (ref 0–1.3)
MONOCYTES RELATIVE PERCENT: 4.4 %
NEUTROPHILS ABSOLUTE: 3.9 K/UL (ref 1.7–7.7)
NEUTROPHILS RELATIVE PERCENT: 61.4 %
PDW BLD-RTO: 13.6 % (ref 12.4–15.4)
PLATELET # BLD: 163 K/UL (ref 135–450)
PMV BLD AUTO: 8.5 FL (ref 5–10.5)
POTASSIUM SERPL-SCNC: 4.1 MMOL/L (ref 3.5–5.1)
RBC # BLD: 4.1 M/UL (ref 4–5.2)
SODIUM BLD-SCNC: 140 MMOL/L (ref 136–145)
TOTAL PROTEIN: 7.5 G/DL (ref 6.4–8.2)
TRIGL SERPL-MCNC: 167 MG/DL (ref 0–150)
TSH SERPL DL<=0.05 MIU/L-ACNC: 1.07 UIU/ML (ref 0.27–4.2)
VLDLC SERPL CALC-MCNC: 33 MG/DL
WBC # BLD: 6.3 K/UL (ref 4–11)

## 2021-11-30 LAB
ESTIMATED AVERAGE GLUCOSE: 79.6 MG/DL
HBA1C MFR BLD: 4.4 %

## 2021-12-01 ENCOUNTER — HOSPITAL ENCOUNTER (OUTPATIENT)
Dept: MAMMOGRAPHY | Age: 51
Discharge: HOME | End: 2021-12-01
Payer: COMMERCIAL

## 2021-12-01 VITALS — BODY MASS INDEX: 21.05 KG/M2 | HEIGHT: 66 IN | WEIGHT: 131 LBS

## 2021-12-01 DIAGNOSIS — Z12.31 VISIT FOR SCREENING MAMMOGRAM: ICD-10-CM

## 2021-12-01 PROCEDURE — 77063 BREAST TOMOSYNTHESIS BI: CPT

## 2022-01-05 ENCOUNTER — NURSE ONLY (OUTPATIENT)
Dept: PRIMARY CARE CLINIC | Age: 52
End: 2022-01-05

## 2022-01-05 DIAGNOSIS — Z11.52 ENCOUNTER FOR SCREENING FOR COVID-19: Primary | ICD-10-CM

## 2022-01-06 LAB — SARS-COV-2, PCR: DETECTED

## 2022-02-08 ENCOUNTER — TELEPHONE (OUTPATIENT)
Dept: CARDIOLOGY CLINIC | Age: 52
End: 2022-02-08

## 2022-02-08 RX ORDER — LISINOPRIL 10 MG/1
10 TABLET ORAL DAILY
Qty: 90 TABLET | Refills: 3 | Status: SHIPPED | OUTPATIENT
Start: 2022-02-08 | End: 2022-05-11 | Stop reason: SDUPTHER

## 2022-02-08 RX ORDER — ATENOLOL 50 MG/1
TABLET ORAL
Qty: 90 TABLET | Refills: 3 | Status: SHIPPED | OUTPATIENT
Start: 2022-02-08 | End: 2022-05-11 | Stop reason: SDUPTHER

## 2022-04-06 NOTE — PROGRESS NOTES
[facility]   Electrophysiology  Office Visit  Date: 4/28/2022    Chief Complaint   Patient presents with    Tachycardia    Palpitations       Cardiac HX: Bety Maldonado is a 49 y.o. 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 Perla Oliver). Interval History/HPI: Patient is here to follow-up for SVT and hypertension. Patient has a longstanding history of palpitations however had no history of SVT. She received her first COVID-vaccine on 1/6/2021 and approximately 8 hours after receiving her vaccine she developed heart racing, palpitations, lightheadedness along with chest pain and shortness of breath. She attempted Valsalva maneuvers at that time with no change in her symptoms. After about 5 minutes of symptoms she ended up calling the [facility] and was noted to be in SVT with an HR of 220 bpm.  She was given 6 mg of adenosine in the [facility] that converted her to normal sinus rhythm. She underwent a catheter ablation for AVNRT and AT on 4/16/2021. She did note some palpitations after the procedure. Recently had an episode while laying in bed when she felt her heart start to race, lasted for about 10 beats and then stopped. This is the longest that she has had since her ablation. She does have a history of hypertension. She currently is on lisinopril 10 mg and atenolol 50 mg daily. Her blood pressure is fairly well controlled in the office today.       Home medications:   Current Outpatient Medications on File Prior to Visit   Medication Sig Dispense Refill    lisinopril (PRINIVIL;ZESTRIL) 10 MG tablet Take 1 tablet by mouth daily 90 tablet 3    atenolol (TENORMIN) 50 MG tablet One tablet by mouth daily 90 tablet 3    meclizine (ANTIVERT) 25 MG tablet Take 25 mg by mouth 3 times daily as needed      Multiple Vitamins-Minerals (THERAPEUTIC MULTIVITAMIN-MINERALS) tablet Take 1 tablet by mouth nightly      famotidine (PEPCID) 20 MG tablet Take 1 tablet by mouth 2 times daily (Patient not taking: Reported on 4/28/2022) 60 tablet 3     No current facility-administered medications on file prior to visit. Past Medical History:   Diagnosis Date    Dizziness     FHx: colon cancer     father dxd late 52's , patient goes to [facility] q5yr    GERD (gastroesophageal reflux disease)     High blood pressure     Hyperlipidemia     Iron deficiency anemia, unspecified 11/29/2018    Malabsorption due to intolerance, not elsewhere classified 11/29/2018    [facility]    one episode adenocard worked then got ablation    Seasonal allergies     Solar lentiginosis 06/25/2019        Past Surgical History:   Procedure Laterality Date    APPENDECTOMY      HYSTERECTOMY, VAGINAL  12/2018    severe bleeding and anemia, fibroids still has ovaries,  Aleksandr Karram    TONSILLECTOMY AND ADENOIDECTOMY         Allergies   Allergen Reactions    Amoxicillin Hives    Dye [Iodides] Hives     IVP dye    Robitussin Dm [Dextromethorphan-Guaifenesin] Hives       Social History:  Reviewed. reports that she has never smoked. She has never used smokeless tobacco. She reports current alcohol use. She reports that she does not use drugs. Family History:  Reviewed. family history includes Cancer in her father; Colon Cancer (age of onset: 58) in her father; Coronary Art Dis (age of onset: 61) in her father; High Cholesterol in her father and mother; Hypertension in her mother; Stroke (age of onset: 76) in her mother. Review of System:    · Constitutional: No fevers, chills. · Eyes: No visual changes or diplopia. No scleral icterus. · ENT: No Headaches. No mouth sores or sore throat. · Cardiovascular: No for chest pain, No for dyspnea on exertion, yes for palpitations or No for loss of consciousness. No cough, hemoptysis, No for pleuritic pain, or phlebitis. · Respiratory: No for cough or wheezing. No hematemesis. · Gastrointestinal: No abdominal pain, blood in stools. · Genitourinary: No dysuria, or hematuria. · Musculoskeletal: No gait disturbance,    · Integumentary: No rash or pruritis. · Neurological: No headache, change in muscle strength, numbness or tingling. · Psychiatric: No anxiety, or depression. · Endocrine: No temperature intolerance. No excessive thirst, fluid intake, or urination. · Hem/Lymph: No abnormal bruising or bleeding, blood clots or swollen lymph nodes. · Allergic/Immunologic: No nasal congestion or hives. Physical Examination:  Vitals:    04/28/22 1155   BP: 134/84   Pulse: 75         Wt Readings from Last 3 Encounters:   04/28/22 126 lb (57.2 kg)   12/01/21 131 lb (59.4 kg)   10/05/21 131 lb 6.4 oz (59.6 kg)       · Constitutional: Oriented. No distress. · Head: Normocephalic and atraumatic. · Mouth/Throat: Oropharynx is clear and moist.   · Eyes: Conjunctivae clear without jaunduice. PERRL. · Neck: Neck supple. No rigidity. No JVD present. · Cardiovascular: Normal rate, regular rhythm, S1&S2. · Pulmonary/Chest: Bilateral respiratory sounds. No wheezes, No rhonchi. · Abdominal: Soft. Bowel sounds present. No distension, No tenderness. · Musculoskeletal: No tenderness. No edema    · Lymphadenopathy: Has no cervical adenopathy. · Neurological: Alert and oriented. Cranial nerve appears intact, No Gross deficit   · Skin: Skin is warm and dry. No rash noted. · Psychiatric: Has a normal mood, affect and behavior     Labs:  Reviewed. No results for input(s): NA, K, CL, CO2, PHOS, BUN, CREATININE, CA in the last 72 hours. Invalid input(s):  TSH  No results for input(s): WBC, HGB, HCT, MCV, PLT in the last 72 hours.   Lab Results   Component Value Date    CKTOTAL 53 10/20/2017    TROPONINI <0.01 01/07/2021     No results found for: BNP  Lab Results   Component Value Date    PROTIME 12.1 04/16/2021    INR 1.04 04/16/2021     Lab Results   Component Value Date    CHOL 187 11/29/2021    HDL 46 11/29/2021    TRIG 167 11/29/2021       ECG: Personally reviewed: NSR, HR 75, , QRS 86, QTc 420    ECHO:  5/24/2021  Summary   Normal left ventricle size, wall thickness, and systolic function with an   estimated ejection fraction of 60-65%. No regional wall motion abnormalities are seen. Normal diastolic function. Mild mitral regurgitation is present. Mild tricuspid regurgitation. Estimated pulmonary artery systolic pressure   is at 27 mmHg assuming a right atrial pressure of 3 mmHg. Stress Test: 11.6.2018  Summary    Normal myocardial perfusion study.        Normal LV size and systolic function.        Non-diagnostic EKG response due to failure to reach target heart rate.    Overall findings represent a low risk study. Cardiac Angiography: N/A    Problem List:   Patient Active Problem List    Diagnosis Date Noted    SVT (supraventricular tachycardia) ([address]     Milia 10/02/2019    FHx: colon cancer 07/23/2019    Adenomatous polyp of descending colon 07/23/2019    Solar lentiginosis 06/25/2019    Iron deficiency anemia, unspecified 11/29/2018    Malabsorption due to intolerance, not elsewhere classified 11/29/2018    Microcytic anemia 11/06/2018    GERD (gastroesophageal reflux disease) 11/06/2018    Precordial pain 11/05/2018    Hypokalemia 11/05/2018    Bunion, right foot 10/11/2014    HTN (hypertension)         Assessment:   1. SVT (supraventricular tachycardia) (HCC)    2. Palpitations    3. Benign essential HTN         Cardiac HX: Suresh Acosta is a 49 y.o. 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 Tera Hurst).     SVT  - In SB - no breakthrough per patient, occasional palpitations  - On atenolol 50 mg daily    HTN  - BP fairly well controlled in the office today  - On atenolol 50 mg QD  - On lisinopril 10 mg QD  - Reviewed recent labs  - Echo an EF of 60 to 65%  - F/u 1 year  - ECG ordered and results personally reviewed     No known systolic HF  No known CAD  No known AF  No Tobacco use. All questions and concerns were addressed to the patient/family. Alternatives to my treatment were discussed. The note was completed using EMR. Every effort was made to ensure accuracy; however, inadvertent computerized transcription errors may be present. Patient received education regarding their diagnosis, treatment and medications while in the office today.       [place] [address] [facility]

## 2022-04-28 ENCOUNTER — OFFICE VISIT (OUTPATIENT)
Dept: CARDIOLOGY CLINIC | Age: 52
End: 2022-04-28
Payer: COMMERCIAL

## 2022-04-28 VITALS
HEIGHT: 66 IN | WEIGHT: 126 LBS | BODY MASS INDEX: 20.25 KG/M2 | SYSTOLIC BLOOD PRESSURE: 134 MMHG | DIASTOLIC BLOOD PRESSURE: 84 MMHG | HEART RATE: 75 BPM

## 2022-04-28 DIAGNOSIS — I10 BENIGN ESSENTIAL HTN: ICD-10-CM

## 2022-04-28 DIAGNOSIS — R00.2 PALPITATIONS: ICD-10-CM

## 2022-04-28 DIAGNOSIS — I47.1 SVT (SUPRAVENTRICULAR TACHYCARDIA) (HCC): Primary | ICD-10-CM

## 2022-04-28 PROCEDURE — 99213 OFFICE O/P EST LOW 20 MIN: CPT | Performed by: NURSE PRACTITIONER

## 2022-04-28 PROCEDURE — G8420 CALC BMI NORM PARAMETERS: HCPCS | Performed by: NURSE PRACTITIONER

## 2022-04-28 PROCEDURE — 93000 ELECTROCARDIOGRAM COMPLETE: CPT | Performed by: NURSE PRACTITIONER

## 2022-04-28 PROCEDURE — G8427 DOCREV CUR MEDS BY ELIG CLIN: HCPCS | Performed by: NURSE PRACTITIONER

## 2022-04-28 PROCEDURE — 3017F COLORECTAL CA SCREEN DOC REV: CPT | Performed by: NURSE PRACTITIONER

## 2022-04-28 PROCEDURE — 1036F TOBACCO NON-USER: CPT | Performed by: NURSE PRACTITIONER

## 2022-05-11 RX ORDER — ATENOLOL 50 MG/1
TABLET ORAL
Qty: 90 TABLET | Refills: 3 | Status: SHIPPED | OUTPATIENT
Start: 2022-05-11

## 2022-05-11 RX ORDER — LISINOPRIL 10 MG/1
10 TABLET ORAL DAILY
Qty: 90 TABLET | Refills: 3 | Status: SHIPPED | OUTPATIENT
Start: 2022-05-11

## 2023-01-30 PROBLEM — E78.00 HIGH CHOLESTEROL: Status: ACTIVE | Noted: 2023-01-30

## 2023-02-10 ENCOUNTER — HOSPITAL ENCOUNTER (OUTPATIENT)
Dept: MAMMOGRAPHY | Age: 53
Discharge: HOME | End: 2023-02-10
Payer: COMMERCIAL

## 2023-02-10 VITALS — WEIGHT: 130 LBS | BODY MASS INDEX: 20.98 KG/M2

## 2023-02-10 DIAGNOSIS — Z12.31 VISIT FOR SCREENING MAMMOGRAM: ICD-10-CM

## 2023-02-10 PROCEDURE — 77067 SCR MAMMO BI INCL CAD: CPT

## 2023-02-13 ENCOUNTER — HOSPITAL ENCOUNTER (OUTPATIENT)
Dept: CT IMAGING | Age: 53
Discharge: HOME | End: 2023-02-13
Payer: COMMERCIAL

## 2023-02-13 DIAGNOSIS — R10.32 LLQ ABDOMINAL PAIN: ICD-10-CM

## 2023-02-13 DIAGNOSIS — R10.31 RLQ ABDOMINAL PAIN: ICD-10-CM

## 2023-02-13 PROCEDURE — 6360000004 HC RX CONTRAST MEDICATION: Performed by: INTERNAL MEDICINE

## 2023-02-13 PROCEDURE — 74176 CT ABD & PELVIS W/O CONTRAST: CPT

## 2023-02-13 RX ADMIN — IOPAMIDOL 50 ML: 510 INJECTION, SOLUTION INTRAVASCULAR at 07:30

## 2023-04-27 ENCOUNTER — OFFICE VISIT (OUTPATIENT)
Dept: CARDIOLOGY CLINIC | Age: 53
End: 2023-04-27
Payer: COMMERCIAL

## 2023-04-27 ENCOUNTER — TELEPHONE (OUTPATIENT)
Dept: CARDIOLOGY CLINIC | Age: 53
End: 2023-04-27

## 2023-04-27 VITALS
SYSTOLIC BLOOD PRESSURE: 140 MMHG | DIASTOLIC BLOOD PRESSURE: 90 MMHG | BODY MASS INDEX: 20.18 KG/M2 | WEIGHT: 125 LBS | HEART RATE: 57 BPM

## 2023-04-27 DIAGNOSIS — I47.1 SVT (SUPRAVENTRICULAR TACHYCARDIA) (HCC): Primary | ICD-10-CM

## 2023-04-27 DIAGNOSIS — R00.2 PALPITATIONS: ICD-10-CM

## 2023-04-27 DIAGNOSIS — I10 BENIGN ESSENTIAL HTN: ICD-10-CM

## 2023-04-27 DIAGNOSIS — R19.7 DIARRHEA, UNSPECIFIED TYPE: ICD-10-CM

## 2023-04-27 PROCEDURE — 3080F DIAST BP >= 90 MM HG: CPT | Performed by: NURSE PRACTITIONER

## 2023-04-27 PROCEDURE — G8420 CALC BMI NORM PARAMETERS: HCPCS | Performed by: NURSE PRACTITIONER

## 2023-04-27 PROCEDURE — 3077F SYST BP >= 140 MM HG: CPT | Performed by: NURSE PRACTITIONER

## 2023-04-27 PROCEDURE — 1036F TOBACCO NON-USER: CPT | Performed by: NURSE PRACTITIONER

## 2023-04-27 PROCEDURE — 99214 OFFICE O/P EST MOD 30 MIN: CPT | Performed by: NURSE PRACTITIONER

## 2023-04-27 PROCEDURE — G8427 DOCREV CUR MEDS BY ELIG CLIN: HCPCS | Performed by: NURSE PRACTITIONER

## 2023-04-27 PROCEDURE — 3017F COLORECTAL CA SCREEN DOC REV: CPT | Performed by: NURSE PRACTITIONER

## 2023-04-27 PROCEDURE — 93000 ELECTROCARDIOGRAM COMPLETE: CPT | Performed by: NURSE PRACTITIONER

## 2023-04-27 PROCEDURE — 93246 EXT ECG>7D<15D RECORDING: CPT | Performed by: INTERNAL MEDICINE

## 2023-04-27 RX ORDER — DICYCLOMINE HCL 20 MG
TABLET ORAL
COMMUNITY
Start: 2023-04-25 | End: 2023-04-27 | Stop reason: SINTOL

## 2023-04-27 RX ORDER — LISINOPRIL 20 MG/1
20 TABLET ORAL DAILY
Qty: 90 TABLET | Refills: 3 | Status: SHIPPED | OUTPATIENT
Start: 2023-04-27

## 2023-05-08 RX ORDER — ATENOLOL 50 MG/1
TABLET ORAL
Qty: 90 TABLET | Refills: 3 | Status: SHIPPED | OUTPATIENT
Start: 2023-05-08

## 2023-05-11 PROCEDURE — 93248 EXT ECG>7D<15D REV&INTERPJ: CPT | Performed by: INTERNAL MEDICINE

## 2023-05-23 DIAGNOSIS — I47.1 SVT (SUPRAVENTRICULAR TACHYCARDIA) (HCC): Primary | ICD-10-CM

## 2024-02-19 ENCOUNTER — HOSPITAL ENCOUNTER (OUTPATIENT)
Dept: GENERAL RADIOLOGY | Age: 54
Discharge: HOME | End: 2024-02-19
Payer: COMMERCIAL

## 2024-02-19 DIAGNOSIS — R05.1 ACUTE COUGH: Primary | ICD-10-CM

## 2024-02-19 DIAGNOSIS — R05.1 ACUTE COUGH: ICD-10-CM

## 2024-02-19 PROCEDURE — 71046 X-RAY EXAM CHEST 2 VIEWS: CPT

## 2024-02-19 RX ORDER — BENZONATATE 100 MG/1
100 CAPSULE ORAL 3 TIMES DAILY PRN
Qty: 30 CAPSULE | Refills: 0 | Status: SHIPPED | OUTPATIENT
Start: 2024-02-19 | End: 2024-02-29

## 2024-02-19 RX ORDER — ALBUTEROL SULFATE 90 UG/1
2 AEROSOL, METERED RESPIRATORY (INHALATION) 4 TIMES DAILY PRN
Qty: 18 G | Refills: 0 | Status: SHIPPED | OUTPATIENT
Start: 2024-02-19

## 2024-02-19 NOTE — PROGRESS NOTES
Patient with ongoing cough x 1 week. Occasional wheezing and chest tightness. Will order albuterol prn and tessalon perles prn.

## 2024-02-20 ENCOUNTER — HOSPITAL ENCOUNTER (OUTPATIENT)
Dept: MAMMOGRAPHY | Age: 54
Discharge: HOME | End: 2024-02-20
Payer: COMMERCIAL

## 2024-02-20 VITALS — HEIGHT: 66 IN | WEIGHT: 120 LBS | BODY MASS INDEX: 19.29 KG/M2

## 2024-02-20 DIAGNOSIS — Z12.31 VISIT FOR SCREENING MAMMOGRAM: ICD-10-CM

## 2024-02-20 PROCEDURE — 77063 BREAST TOMOSYNTHESIS BI: CPT

## 2024-03-29 RX ORDER — AZITHROMYCIN 250 MG/1
TABLET, FILM COATED ORAL
Qty: 6 TABLET | Refills: 0 | Status: SHIPPED | OUTPATIENT
Start: 2024-03-29 | End: 2024-04-08

## 2024-04-29 RX ORDER — LISINOPRIL 20 MG/1
20 TABLET ORAL DAILY
Qty: 90 TABLET | Refills: 3 | Status: SHIPPED | OUTPATIENT
Start: 2024-04-29

## 2024-04-29 RX ORDER — ATENOLOL 50 MG/1
TABLET ORAL
Qty: 90 TABLET | Refills: 2 | Status: SHIPPED | OUTPATIENT
Start: 2024-04-29

## 2024-04-29 NOTE — TELEPHONE ENCOUNTER
Requested Prescriptions     Pending Prescriptions Disp Refills    atenolol (TENORMIN) 50 MG tablet [Pharmacy Med Name: ATENOLOL 50 MG TABLET] 90 tablet 2     Sig: TAKE ONE TABLET BY MOUTH DAILY    lisinopril (PRINIVIL;ZESTRIL) 20 MG tablet 90 tablet 3     Sig: Take 1 tablet by mouth daily          Number: 90    Refills: 3    Last Office Visit: 4/27/2023     Next Office Visit: Visit date not found

## 2024-06-17 DIAGNOSIS — I47.10 SVT (SUPRAVENTRICULAR TACHYCARDIA) (HCC): Primary | ICD-10-CM

## 2024-12-24 RX ORDER — MECLIZINE HYDROCHLORIDE 25 MG/1
25 TABLET ORAL 3 TIMES DAILY PRN
Qty: 30 TABLET | Refills: 3 | Status: SHIPPED | OUTPATIENT
Start: 2024-12-24 | End: 2025-02-02

## 2025-01-27 RX ORDER — ATENOLOL 50 MG/1
TABLET ORAL
Qty: 90 TABLET | Refills: 3 | Status: SHIPPED | OUTPATIENT
Start: 2025-01-27

## 2025-01-27 NOTE — TELEPHONE ENCOUNTER
Requested Prescriptions     Pending Prescriptions Disp Refills    atenolol (TENORMIN) 50 MG tablet 90 tablet 3     Sig: TAKE ONE TABLET BY MOUTH DAILY            Checked Correct Pharmacy: Yes    Any changes since last refill? No     Number: 90  Refills: 3    Last OV: 4/27/2023 Provider: KARENA    Next OV: 5/1/2025 Provider: KARENA    Last Labs:   CBC:   Lab Results   Component Value Date    WBC 6.1 02/06/2023    HGB 13.8 02/06/2023    HCT 41.1 02/06/2023    MCV 93.5 02/06/2023     02/06/2023     CMP:   Lab Results   Component Value Date     02/06/2023    K 4.5 02/06/2023     02/06/2023    CO2 24 02/06/2023    BUN 13 02/06/2023    CREATININE 0.8 02/06/2023    GLUCOSE 98 02/06/2023    CALCIUM 9.9 02/06/2023    BILITOT 0.7 02/06/2023    ALKPHOS 66 02/06/2023    AST 17 02/06/2023    ALT 10 02/06/2023    LABGLOM >60 02/06/2023    GFRAA >60 11/29/2021    AGRATIO 2.0 02/06/2023    GLOB 2.4 01/07/2021          Lipid:   Lab Results   Component Value Date    CHOL 172 02/06/2023    TRIG 79 02/06/2023    HDL 48 02/06/2023     (H) 02/06/2023    VLDL 16 02/06/2023     TSH:   Lab Results   Component Value Date    TSH 0.85 02/06/2023      Assessment:   1. SVT (supraventricular tachycardia) (HCC)    2. Palpitations    3. Benign essential HTN    4. Diarrhea, unspecified type          Cardiac HX: Kae Reynolds is a 52 y.o. 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 Toney).     SVT  - In SB - HR 57  - + breakthrough of palps  - On atenolol 50 mg QD  - 2 week CAM  - F/u in 1 year     HTN  - BP high in the office  - On recheck 142/100 R arm, 136/95 L arm   - On atenolol 50 mg QD  - On lisinopril 10 mg QD - will go up to 20 mg QD  - BP check in 1 week both arms  - If still elevated will go up to 40 mg QD with a BMP check 2 weeks after increase  - Reviewed recent labs  - Echo  EF of 60 to 65%  - Lifestyle modification - exercise, diet  - ECG ordered and results

## 2025-02-21 ENCOUNTER — HOSPITAL ENCOUNTER (OUTPATIENT)
Dept: MAMMOGRAPHY | Age: 55
Discharge: HOME | End: 2025-02-21
Payer: COMMERCIAL

## 2025-02-21 VITALS — BODY MASS INDEX: 20.89 KG/M2 | HEIGHT: 66 IN | WEIGHT: 130 LBS

## 2025-02-21 DIAGNOSIS — Z12.31 VISIT FOR SCREENING MAMMOGRAM: ICD-10-CM

## 2025-02-21 PROCEDURE — 77063 BREAST TOMOSYNTHESIS BI: CPT

## 2025-02-27 RX ORDER — METOPROLOL TARTRATE 25 MG/1
25 TABLET, FILM COATED ORAL 2 TIMES DAILY PRN
Qty: 30 TABLET | Refills: 3 | Status: SHIPPED | OUTPATIENT
Start: 2025-02-27 | End: 2025-02-27 | Stop reason: CLARIF

## 2025-02-27 RX ORDER — METOPROLOL TARTRATE 25 MG/1
25 TABLET, FILM COATED ORAL 2 TIMES DAILY PRN
Qty: 30 TABLET | Refills: 3 | Status: SHIPPED | OUTPATIENT
Start: 2025-02-27

## 2025-03-24 ENCOUNTER — OFFICE VISIT (OUTPATIENT)
Dept: CARDIOLOGY CLINIC | Age: 55
End: 2025-03-24
Payer: COMMERCIAL

## 2025-03-24 VITALS
HEART RATE: 57 BPM | DIASTOLIC BLOOD PRESSURE: 90 MMHG | WEIGHT: 137 LBS | SYSTOLIC BLOOD PRESSURE: 130 MMHG | BODY MASS INDEX: 22.11 KG/M2

## 2025-03-24 DIAGNOSIS — I10 BENIGN ESSENTIAL HTN: ICD-10-CM

## 2025-03-24 DIAGNOSIS — R00.2 PALPITATIONS: Primary | ICD-10-CM

## 2025-03-24 DIAGNOSIS — I47.19 ATRIAL TACHYCARDIA: ICD-10-CM

## 2025-03-24 DIAGNOSIS — I47.10 SVT (SUPRAVENTRICULAR TACHYCARDIA): ICD-10-CM

## 2025-03-24 DIAGNOSIS — I48.0 PAROXYSMAL ATRIAL FIBRILLATION (HCC): ICD-10-CM

## 2025-03-24 PROCEDURE — 3017F COLORECTAL CA SCREEN DOC REV: CPT | Performed by: NURSE PRACTITIONER

## 2025-03-24 PROCEDURE — 3075F SYST BP GE 130 - 139MM HG: CPT | Performed by: NURSE PRACTITIONER

## 2025-03-24 PROCEDURE — 1036F TOBACCO NON-USER: CPT | Performed by: NURSE PRACTITIONER

## 2025-03-24 PROCEDURE — 99214 OFFICE O/P EST MOD 30 MIN: CPT | Performed by: NURSE PRACTITIONER

## 2025-03-24 PROCEDURE — G8420 CALC BMI NORM PARAMETERS: HCPCS | Performed by: NURSE PRACTITIONER

## 2025-03-24 PROCEDURE — G8427 DOCREV CUR MEDS BY ELIG CLIN: HCPCS | Performed by: NURSE PRACTITIONER

## 2025-03-24 PROCEDURE — 93000 ELECTROCARDIOGRAM COMPLETE: CPT | Performed by: NURSE PRACTITIONER

## 2025-03-24 PROCEDURE — 3080F DIAST BP >= 90 MM HG: CPT | Performed by: NURSE PRACTITIONER

## 2025-03-24 RX ORDER — LISINOPRIL 40 MG/1
40 TABLET ORAL DAILY
Qty: 90 TABLET | Refills: 3 | Status: SHIPPED | OUTPATIENT
Start: 2025-03-24

## 2025-03-24 RX ORDER — METOPROLOL TARTRATE 50 MG
50 TABLET ORAL NIGHTLY
COMMUNITY
End: 2025-03-24 | Stop reason: CLARIF

## 2025-03-24 NOTE — PROGRESS NOTES
[facility]   Electrophysiology  Office Visit  Date: 3/24/2025    Chief Complaint   Patient presents with    Atrial Fibrillation    Tachycardia    Palpitations    Hypertension    Shortness of Breath     Cardiac HX: Kae Reynolds is a 54 y.o. woman with a h/o HTN, HLD, GERD, SVT, s/p RFA of AVNRT/AT (4/2021, Dr. Toney), new onset AF (2/2025).    Interval History/HPI: Patient is here for follow-up for new onset atrial fibrillation and SVT.  Patient had a longstanding history of palpitations.  She received her first COVID-vaccine in January 2021 and later that evening developed heart racing, palpitations, lightheadedness along with chest pain and shortness of breath.  She attempted Valsalva maneuvers with no change in her symptoms.  She called the life squad and was noted to be in SVT with a heart rate of 220 bpm.  She was given 6 mg of adenosine and then [facility] converted to normal sinus rhythm.  She then underwent an RFA of AVNRT/SVT and AT in April 2021.  Postprocedure she did have episodes of palpitations.  She also has a longstanding history of hypertension that has not been well-controlled.  She currently is on atenolol 50 and lisinopril 20 mg daily.  Initially her blood pressure was elevated in the office today at 130/90.  On recheck she was 200/94 in the left and 150/104 on the right.  In February 2025 she had been exercising when she felt her heart go out of rhythm.  She felt heart racing and palpitations and noted her heart rate was in the 180s.  That episode lasted about 5 minutes in length.  She did do a printout on her Apple watch which was reviewed with Dr. Toney who felt it was atrial fibrillation.  She was given metoprolol 25 mg twice daily as needed for breakthrough AF.  Today she presents in normal sinus rhythm.  She did have an episode last night that lasted approximately 1 minute.  She had been packing boxes at her mother's house when all of a sudden she felt her heart rate elevate.

## 2025-03-29 SDOH — HEALTH STABILITY: PHYSICAL HEALTH: ON AVERAGE, HOW MANY MINUTES DO YOU ENGAGE IN EXERCISE AT THIS LEVEL?: 40 MIN

## 2025-03-29 SDOH — HEALTH STABILITY: PHYSICAL HEALTH: ON AVERAGE, HOW MANY DAYS PER WEEK DO YOU ENGAGE IN MODERATE TO STRENUOUS EXERCISE (LIKE A BRISK WALK)?: 4 DAYS

## 2025-03-31 ENCOUNTER — RESULTS FOLLOW-UP (OUTPATIENT)
Dept: CARDIOLOGY CLINIC | Age: 55
End: 2025-03-31

## 2025-04-01 ENCOUNTER — RESULTS FOLLOW-UP (OUTPATIENT)
Dept: FAMILY MEDICINE CLINIC | Age: 55
End: 2025-04-01

## 2025-04-01 ENCOUNTER — OFFICE VISIT (OUTPATIENT)
Dept: FAMILY MEDICINE CLINIC | Age: 55
End: 2025-04-01
Payer: COMMERCIAL

## 2025-04-01 VITALS
HEIGHT: 66 IN | DIASTOLIC BLOOD PRESSURE: 82 MMHG | HEART RATE: 63 BPM | WEIGHT: 133 LBS | OXYGEN SATURATION: 98 % | TEMPERATURE: 97.2 F | SYSTOLIC BLOOD PRESSURE: 124 MMHG | BODY MASS INDEX: 21.38 KG/M2

## 2025-04-01 DIAGNOSIS — I48.0 PAROXYSMAL ATRIAL FIBRILLATION (HCC): ICD-10-CM

## 2025-04-01 DIAGNOSIS — R79.89 LOW VITAMIN D LEVEL: ICD-10-CM

## 2025-04-01 DIAGNOSIS — Z76.89 ENCOUNTER TO ESTABLISH CARE: Primary | ICD-10-CM

## 2025-04-01 DIAGNOSIS — E78.00 ELEVATED LDL CHOLESTEROL LEVEL: ICD-10-CM

## 2025-04-01 DIAGNOSIS — K21.9 GASTROESOPHAGEAL REFLUX DISEASE, UNSPECIFIED WHETHER ESOPHAGITIS PRESENT: ICD-10-CM

## 2025-04-01 DIAGNOSIS — R00.2 PALPITATIONS: ICD-10-CM

## 2025-04-01 DIAGNOSIS — I47.10 SVT (SUPRAVENTRICULAR TACHYCARDIA): ICD-10-CM

## 2025-04-01 LAB
25(OH)D3 SERPL-MCNC: 38.8 NG/ML
ANION GAP SERPL CALCULATED.3IONS-SCNC: 12 MMOL/L (ref 3–16)
BUN SERPL-MCNC: 19 MG/DL (ref 7–20)
CALCIUM SERPL-MCNC: 10.3 MG/DL (ref 8.3–10.6)
CHLORIDE SERPL-SCNC: 101 MMOL/L (ref 99–110)
CHOLEST SERPL-MCNC: 202 MG/DL (ref 0–199)
CO2 SERPL-SCNC: 27 MMOL/L (ref 21–32)
CREAT SERPL-MCNC: 0.8 MG/DL (ref 0.6–1.1)
DEPRECATED RDW RBC AUTO: 13.1 % (ref 12.4–15.4)
GFR SERPLBLD CREATININE-BSD FMLA CKD-EPI: 88 ML/MIN/{1.73_M2}
GLUCOSE SERPL-MCNC: 96 MG/DL (ref 70–99)
HCT VFR BLD AUTO: 38 % (ref 36–48)
HDLC SERPL-MCNC: 49 MG/DL (ref 40–60)
HGB BLD-MCNC: 13.2 G/DL (ref 12–16)
LDL CHOLESTEROL: 137 MG/DL
MAGNESIUM SERPL-MCNC: 2.04 MG/DL (ref 1.8–2.4)
MCH RBC QN AUTO: 31.4 PG (ref 26–34)
MCHC RBC AUTO-ENTMCNC: 34.7 G/DL (ref 31–36)
MCV RBC AUTO: 90.6 FL (ref 80–100)
PLATELET # BLD AUTO: 173 K/UL (ref 135–450)
PMV BLD AUTO: 8.5 FL (ref 5–10.5)
POTASSIUM SERPL-SCNC: 4.8 MMOL/L (ref 3.5–5.1)
RBC # BLD AUTO: 4.19 M/UL (ref 4–5.2)
SODIUM SERPL-SCNC: 140 MMOL/L (ref 136–145)
TRIGL SERPL-MCNC: 82 MG/DL (ref 0–150)
TSH SERPL DL<=0.005 MIU/L-ACNC: 1 UIU/ML (ref 0.27–4.2)
VLDLC SERPL CALC-MCNC: 16 MG/DL
WBC # BLD AUTO: 5 K/UL (ref 4–11)

## 2025-04-01 PROCEDURE — 1036F TOBACCO NON-USER: CPT | Performed by: NURSE PRACTITIONER

## 2025-04-01 PROCEDURE — G8427 DOCREV CUR MEDS BY ELIG CLIN: HCPCS | Performed by: NURSE PRACTITIONER

## 2025-04-01 PROCEDURE — 3079F DIAST BP 80-89 MM HG: CPT | Performed by: NURSE PRACTITIONER

## 2025-04-01 PROCEDURE — 3017F COLORECTAL CA SCREEN DOC REV: CPT | Performed by: NURSE PRACTITIONER

## 2025-04-01 PROCEDURE — 3074F SYST BP LT 130 MM HG: CPT | Performed by: NURSE PRACTITIONER

## 2025-04-01 PROCEDURE — 99214 OFFICE O/P EST MOD 30 MIN: CPT | Performed by: NURSE PRACTITIONER

## 2025-04-01 PROCEDURE — G8420 CALC BMI NORM PARAMETERS: HCPCS | Performed by: NURSE PRACTITIONER

## 2025-04-01 SDOH — ECONOMIC STABILITY: FOOD INSECURITY: WITHIN THE PAST 12 MONTHS, YOU WORRIED THAT YOUR FOOD WOULD RUN OUT BEFORE YOU GOT MONEY TO BUY MORE.: NEVER TRUE

## 2025-04-01 SDOH — ECONOMIC STABILITY: FOOD INSECURITY: WITHIN THE PAST 12 MONTHS, THE FOOD YOU BOUGHT JUST DIDN'T LAST AND YOU DIDN'T HAVE MONEY TO GET MORE.: NEVER TRUE

## 2025-04-01 ASSESSMENT — PATIENT HEALTH QUESTIONNAIRE - PHQ9
SUM OF ALL RESPONSES TO PHQ QUESTIONS 1-9: 0
SUM OF ALL RESPONSES TO PHQ QUESTIONS 1-9: 0
2. FEELING DOWN, DEPRESSED OR HOPELESS: NOT AT ALL
SUM OF ALL RESPONSES TO PHQ QUESTIONS 1-9: 0
SUM OF ALL RESPONSES TO PHQ QUESTIONS 1-9: 0
1. LITTLE INTEREST OR PLEASURE IN DOING THINGS: NOT AT ALL

## 2025-04-01 ASSESSMENT — ENCOUNTER SYMPTOMS
RESPIRATORY NEGATIVE: 1
NAUSEA: 1

## 2025-04-01 NOTE — PROGRESS NOTES
Immunization History   Administered Date(s) Administered    COVID-19, MODERNA BLUE border, Primary or Immunocompromised, (age 12y+), IM, 100 mcg/0.5mL 01/06/2021    Influenza Virus Vaccine 10/17/2013, 10/17/2017, 10/24/2018, 10/10/2019, 10/19/2021    Influenza Whole 10/17/2013

## 2025-04-01 NOTE — PATIENT INSTRUCTIONS
Thank you for choosing [facility].    Please bring a current list of medications to every appointment.    Please contact your pharmacy for any prescription refill(s) that you are requesting.

## 2025-04-01 NOTE — PROGRESS NOTES
Kae Reynolds (:  1970) is a 54 y.o. female,New patient, here for evaluation of the following chief complaint(s):  New Patient (Establish care with WILLIAN Burris NP.  Patient request fasting lab order) and Other (Patient c/o shortness of breath with increased activity; chest pressure with atrial fib. Patient does see Cardiology.  Patient also notes soreness in hands within the last month)      Assessment & Plan   ASSESSMENT/PLAN:  1. Encounter to establish care  Reviewed PMH meds and labs.    2. SVT (supraventricular tachycardia)  Resolved. Pt follows with Cardiology. No change to medications today.     3. Gastroesophageal reflux disease, unspecified whether esophagitis present  Stable. Pt with chronic nausea, PRN tums and PPI    4. Elevated LDL cholesterol level  Stable due for labs  - Lipid, Fasting; Future    5. Low vitamin D level  Stable due for labs.   - Vitamin D 25 Hydroxy; Future       Return for yearly physical.         Subjective   SUBJECTIVE/OBJECTIVE:  HPI  Presents today to est care. PMH sig for Afib, SVT, HTN.  2021 developed palpitations s/p COVID vacc, noted to be in Afib and SVT. Est with Dr Toney. Manages medications. LOV 3/24/2025, NOV 6 month follow up due in 2025. Had ECHO EF 55-60%. Cardiac ablation in 2021, NSR since, has had a few breakthroughs Afib, fleeting epsiode, not had to use BB  Exercises routinly, denies diets. Eats out at sit down restraunts  Resp- inhaler since sinus infections, on hand does not use often.  Works full time  at [employer]   Lives at home with .   Mammogram UTD,   GYN at [facility]. PAP UTD no hx of abnormal  UTD vision and dental.   GI-underlying nausea, Dr. Benoit. Chronic nausea, 5 days out of a yr ok. Did not tolerate bentyl. No relief with hyoscymine. Constipation, takes benefiber, drinks a lot of water, still struggles with edu,mucous. Colonoscopy> polyps, dad colon ca.   Current Outpatient

## 2025-04-14 ENCOUNTER — HOSPITAL ENCOUNTER (OUTPATIENT)
Dept: SLEEP CENTER | Age: 55
Discharge: HOME | End: 2025-04-14
Payer: COMMERCIAL

## 2025-04-14 DIAGNOSIS — R06.83 SNORING: ICD-10-CM

## 2025-04-14 DIAGNOSIS — G47.10 HYPERSOMNIA: ICD-10-CM

## 2025-04-14 PROCEDURE — 95806 SLEEP STUDY UNATT&RESP EFFT: CPT

## 2025-04-15 DIAGNOSIS — E78.5 HYPERLIPIDEMIA, UNSPECIFIED HYPERLIPIDEMIA TYPE: Primary | ICD-10-CM

## 2025-04-16 NOTE — PROCEDURES
PROCEDURE NOTE  Date: 4/16/2025   Name: Kae Reynolds  YOB: 1970    Procedures            Electronically signed by EDWARD GAMBLE MD on 4/16/25 at 11:10 AM EDT

## 2025-04-29 ENCOUNTER — RESULTS FOLLOW-UP (OUTPATIENT)
Dept: CARDIOLOGY CLINIC | Age: 55
End: 2025-04-29

## 2025-04-29 DIAGNOSIS — R07.9 CHEST PAIN, UNSPECIFIED TYPE: Primary | ICD-10-CM

## 2025-04-29 DIAGNOSIS — I48.0 PAROXYSMAL ATRIAL FIBRILLATION (HCC): ICD-10-CM

## 2025-04-29 DIAGNOSIS — I25.10 CORONARY ARTERY CALCIFICATION: ICD-10-CM

## 2025-04-29 RX ORDER — SODIUM CHLORIDE 9 MG/ML
INJECTION, SOLUTION INTRAVENOUS PRN
OUTPATIENT
Start: 2025-04-29

## 2025-04-29 RX ORDER — NITROGLYCERIN 0.4 MG/1
0.4 TABLET SUBLINGUAL
OUTPATIENT
Start: 2025-04-29

## 2025-04-29 RX ORDER — METOPROLOL TARTRATE 50 MG
TABLET ORAL
Qty: 3 TABLET | Refills: 0 | Status: SHIPPED | OUTPATIENT
Start: 2025-04-29

## 2025-04-29 RX ORDER — METOPROLOL TARTRATE 1 MG/ML
5 INJECTION, SOLUTION INTRAVENOUS EVERY 5 MIN PRN
OUTPATIENT
Start: 2025-04-29

## 2025-04-29 RX ORDER — SODIUM CHLORIDE 0.9 % (FLUSH) 0.9 %
5-40 SYRINGE (ML) INJECTION EVERY 12 HOURS SCHEDULED
OUTPATIENT
Start: 2025-04-29

## 2025-04-29 RX ORDER — SODIUM CHLORIDE 0.9 % (FLUSH) 0.9 %
5-40 SYRINGE (ML) INJECTION PRN
OUTPATIENT
Start: 2025-04-29

## 2025-04-29 RX ORDER — NITROGLYCERIN 0.4 MG/1
0.8 TABLET SUBLINGUAL
OUTPATIENT
Start: 2025-04-29

## 2025-04-30 DIAGNOSIS — E78.2 MIXED HYPERLIPIDEMIA: Primary | ICD-10-CM

## 2025-04-30 DIAGNOSIS — I25.10 CORONARY ARTERY DISEASE INVOLVING NATIVE CORONARY ARTERY OF NATIVE HEART WITHOUT ANGINA PECTORIS: Primary | ICD-10-CM

## 2025-04-30 RX ORDER — PREDNISONE 50 MG/1
50 TABLET ORAL EVERY 12 HOURS
Qty: 3 TABLET | Refills: 0 | Status: SHIPPED | OUTPATIENT
Start: 2025-04-30 | End: 2025-05-03

## 2025-04-30 NOTE — PROGRESS NOTES
Standard allergy premedication for dye allergy:  50 mg prednisone p.o. 24 hours prior to CT scan.  50 mg prednisone p.o. 12 Hours prior to CT scan.  50 mg prednisone p.o. 1 hour prior to CT scan.  25 mg Benadryl p.o. 1 hour prior to CT scan.     Avoid testing altogether if anaphylactic allergy.

## 2025-05-02 DIAGNOSIS — I25.10 CORONARY ARTERY DISEASE INVOLVING NATIVE CORONARY ARTERY OF NATIVE HEART WITHOUT ANGINA PECTORIS: ICD-10-CM

## 2025-05-02 DIAGNOSIS — E78.2 MIXED HYPERLIPIDEMIA: ICD-10-CM

## 2025-05-03 LAB
CRP SERPL HS-MCNC: 1.46 MG/L (ref 0.16–3)
HCYS SERPL-SCNC: 10 UMOL/L (ref 0–10)

## 2025-05-05 LAB — LPA SERPL-MCNC: 33 MG/DL

## 2025-05-08 LAB
CHOLEST SERPL-MCNC: 199 MG/DL
HDL SERPL QN: 8.7 NM
HDL SERPL-SCNC: 40.6 UMOL/L
HDLC SERPL-MCNC: 48 MG/DL (ref 40–59)
HLD.LARGE SERPL-SCNC: 5.6 UMOL/L
LDL SERPL QN: 21.1 NM
LDL SERPL-SCNC: 1813 NMOL/L
LDL SMALL SERPL-SCNC: 623 NMOL/L
LDLC SERPL CALC-MCNC: 133 MG/DL
PATHOLOGY STUDY: ABNORMAL
TRIGL SERPL-MCNC: 89 MG/DL (ref 30–149)
VLDL LARGE SERPL-SCNC: 2.2 NMOL/L
VLDL SERPL QN: 45.5 NM

## 2025-05-12 ENCOUNTER — RESULTS FOLLOW-UP (OUTPATIENT)
Dept: CARDIOLOGY CLINIC | Age: 55
End: 2025-05-12

## 2025-05-12 DIAGNOSIS — E78.1 PURE HYPERTRIGLYCERIDEMIA: Primary | ICD-10-CM

## 2025-05-12 DIAGNOSIS — E78.2 MIXED HYPERLIPIDEMIA: ICD-10-CM

## 2025-05-12 RX ORDER — ROSUVASTATIN CALCIUM 5 MG/1
5 TABLET, COATED ORAL DAILY
Qty: 30 TABLET | Refills: 3 | Status: SHIPPED | OUTPATIENT
Start: 2025-05-12

## 2025-05-13 RX ORDER — PREDNISONE 50 MG/1
50 TABLET ORAL EVERY 12 HOURS
Qty: 3 TABLET | Refills: 0 | Status: SHIPPED | OUTPATIENT
Start: 2025-05-13 | End: 2025-05-15

## 2025-05-15 ENCOUNTER — TELEPHONE (OUTPATIENT)
Dept: CARDIOLOGY CLINIC | Age: 55
End: 2025-05-15

## 2025-05-15 NOTE — TELEPHONE ENCOUNTER
[facility] pharmacy called wanting to clarify instructions for prednisone.     [phone]

## 2025-06-02 ENCOUNTER — RESULTS FOLLOW-UP (OUTPATIENT)
Dept: CARDIOLOGY CLINIC | Age: 55
End: 2025-06-02

## 2025-08-06 RX ORDER — ROSUVASTATIN CALCIUM 5 MG/1
5 TABLET, COATED ORAL DAILY
Qty: 90 TABLET | Refills: 3 | Status: SHIPPED | OUTPATIENT
Start: 2025-08-06

## 2025-08-13 ENCOUNTER — PATIENT MESSAGE (OUTPATIENT)
Dept: FAMILY MEDICINE CLINIC | Age: 55
End: 2025-08-13

## 2025-08-13 DIAGNOSIS — R10.84 GENERALIZED ABDOMINAL PAIN: Primary | ICD-10-CM

## 2025-08-25 DIAGNOSIS — R10.9 ABDOMINAL PAIN, UNSPECIFIED ABDOMINAL LOCATION: Primary | ICD-10-CM

## 2025-08-29 DIAGNOSIS — R10.9 ABDOMINAL PAIN, UNSPECIFIED ABDOMINAL LOCATION: ICD-10-CM

## 2025-09-01 LAB — H PYLORI AG STL QL IA: NEGATIVE

## 2025-09-02 ENCOUNTER — RESULTS FOLLOW-UP (OUTPATIENT)
Dept: FAMILY MEDICINE CLINIC | Age: 55
End: 2025-09-02